# Patient Record
Sex: MALE | Race: BLACK OR AFRICAN AMERICAN | Employment: PART TIME | URBAN - METROPOLITAN AREA
[De-identification: names, ages, dates, MRNs, and addresses within clinical notes are randomized per-mention and may not be internally consistent; named-entity substitution may affect disease eponyms.]

---

## 2018-03-13 ENCOUNTER — HOSPITAL ENCOUNTER (OUTPATIENT)
Dept: PHYSICAL THERAPY | Age: 43
Setting detail: THERAPIES SERIES
Discharge: HOME | End: 2018-03-13
Payer: COMMERCIAL

## 2018-03-19 ENCOUNTER — HOSPITAL ENCOUNTER (OUTPATIENT)
Dept: PHYSICAL THERAPY | Age: 43
Setting detail: THERAPIES SERIES
Discharge: HOME | End: 2018-03-19
Payer: COMMERCIAL

## 2018-03-19 PROCEDURE — 97110 THERAPEUTIC EXERCISES: CPT

## 2018-03-19 PROCEDURE — G0283 ELEC STIM OTHER THAN WOUND: HCPCS

## 2018-03-21 ENCOUNTER — HOSPITAL ENCOUNTER (OUTPATIENT)
Dept: PHYSICAL THERAPY | Age: 43
Setting detail: THERAPIES SERIES
Discharge: HOME | End: 2018-03-21
Payer: COMMERCIAL

## 2018-03-21 PROCEDURE — G8982 BODY POS GOAL STATUS: HCPCS

## 2018-03-21 PROCEDURE — G0283 ELEC STIM OTHER THAN WOUND: HCPCS

## 2018-03-21 PROCEDURE — 97110 THERAPEUTIC EXERCISES: CPT

## 2018-03-21 PROCEDURE — G8981 BODY POS CURRENT STATUS: HCPCS

## 2018-03-21 NOTE — PROGRESS NOTES
[facility]  Phone: [phone] Fax: [phone]       Physical Therapy Daily Treatment Note  Date:  3/21/2018    Patient Name:  Tonya Arguello    :  1975  MRN: [id number]    Restrictions/Precautions:    Diagnosis:  Chronic Mid Thoracic Pain   Treatment Diagnosis:    Insurance/Certification information:  [facility]   Referring Physician:  Haroldo Alcantar CNP   Plan of care signed (Y/N):    Visit# / total visits:  11/  Pain level: /10  Time In: 0920      Time Out:     1000     Subjective:      Exercises:  Exercise/Equipment Resistance/Repetitions Other comments    AirDyne for UE  8 min      Prayer Stretch   L/R 10 reps      Doorway Stretch   10 reps       Wall Jovanny  5 reps                                                                                                                   Other Therapeutic Activities:      Home Exercise Program:      Manual Treatments:      Modalities:  Interferential  estim Lumbar  paraspinal region bilateral 20 min with heat     Timed Code Treatment Minutes:  30     Total Treatment Minutes:  50     Treatment/Activity Tolerance:  [x] Patient tolerated treatment well [] Patient limited by fatigue  [] Patient limited by pain  [] Patient limited by other medical complications  [] Other:     Prognosis: [] Good [x] Fair  [] Poor    Patient Requires Follow-up: [x] Yes  [] No    Plan:   [x] Continue per plan of care [] Alter current plan (see comments)  [] Plan of care initiated [] Hold pending MD visit [] Discharge  Plan for Next Session:         Electronically signed by:  Jesus Godfrey, [address] [place] [address]

## 2018-03-28 ENCOUNTER — HOSPITAL ENCOUNTER (OUTPATIENT)
Dept: PHYSICAL THERAPY | Age: 43
Setting detail: THERAPIES SERIES
Discharge: HOME | End: 2018-03-28
Payer: COMMERCIAL

## 2018-03-28 PROCEDURE — G0283 ELEC STIM OTHER THAN WOUND: HCPCS

## 2018-03-28 PROCEDURE — 97110 THERAPEUTIC EXERCISES: CPT

## 2018-05-29 ENCOUNTER — HOSPITAL ENCOUNTER (OUTPATIENT)
Dept: PHYSICAL THERAPY | Age: 43
Setting detail: THERAPIES SERIES
Discharge: HOME | End: 2018-05-29
Payer: COMMERCIAL

## 2018-05-29 PROCEDURE — G0283 ELEC STIM OTHER THAN WOUND: HCPCS

## 2018-05-29 PROCEDURE — 97161 PT EVAL LOW COMPLEX 20 MIN: CPT

## 2018-05-29 PROCEDURE — G8981 BODY POS CURRENT STATUS: HCPCS

## 2018-05-29 PROCEDURE — G8982 BODY POS GOAL STATUS: HCPCS

## 2018-06-15 ENCOUNTER — HOSPITAL ENCOUNTER (OUTPATIENT)
Dept: PHYSICAL THERAPY | Age: 43
Setting detail: THERAPIES SERIES
Discharge: HOME | End: 2018-06-15
Payer: COMMERCIAL

## 2018-06-15 PROCEDURE — 97110 THERAPEUTIC EXERCISES: CPT

## 2018-06-15 PROCEDURE — G0283 ELEC STIM OTHER THAN WOUND: HCPCS

## 2018-06-18 ENCOUNTER — HOSPITAL ENCOUNTER (OUTPATIENT)
Dept: PHYSICAL THERAPY | Age: 43
Setting detail: THERAPIES SERIES
Discharge: HOME | End: 2018-06-18
Payer: COMMERCIAL

## 2018-06-18 PROCEDURE — 97110 THERAPEUTIC EXERCISES: CPT

## 2018-06-18 PROCEDURE — G0283 ELEC STIM OTHER THAN WOUND: HCPCS

## 2018-07-09 ENCOUNTER — HOSPITAL ENCOUNTER (OUTPATIENT)
Dept: PHYSICAL THERAPY | Age: 43
Setting detail: THERAPIES SERIES
Discharge: HOME | End: 2018-07-09
Payer: COMMERCIAL

## 2018-07-09 PROCEDURE — 97110 THERAPEUTIC EXERCISES: CPT

## 2018-07-09 PROCEDURE — G0283 ELEC STIM OTHER THAN WOUND: HCPCS

## 2018-07-09 NOTE — PROGRESS NOTES
[facility]  Phone: [phone] Fax: [phone]       Physical Therapy Daily Treatment Note  Date:  2018    Patient Name:  Aide Yang    :  1975  MRN: [id number]    Restrictions/Precautions:    Diagnosis:  Back Pain   Treatment Diagnosis:    Insurance/Certification information: [employer]    Referring Physician:  Brayan Smith CNP  Plan of care signed (Y/N):    Visit# / total visits:  4/  Pain level: /10  Time In: 900       Time Out:  940        Subjective:      Exercises:  Exercise/Equipment Resistance/Repetitions Other comments     Bike 10 min       Prayer stretch  10 reps       Doorway Stretch  10 reps     Wall zak   10 reps                                                                                                                   Other Therapeutic Activities:      Home Exercise Program:      Manual Treatments:      Modalities:  Pre Mod Estim Bilateral Thoaco-lumbar paraspinal muscles 15 min with heat     Timed Code Treatment Minutes:  30    Total Treatment Minutes: 40     Treatment/Activity Tolerance:  [x] Patient tolerated treatment well [] Patient limited by fatigue  [] Patient limited by pain  [] Patient limited by other medical complications  [] Other:     Prognosis: [] Good [x] Fair  [] Poor    Patient Requires Follow-up: [x] Yes  [] No    Plan:   [x] Continue per plan of care [] Alter current plan (see comments)  [] Plan of care initiated [] Hold pending MD visit [] Discharge  Plan for Next Session:         Electronically signed by:  Cristopher Moore, [address] [place] [address]

## 2018-07-12 ENCOUNTER — HOSPITAL ENCOUNTER (OUTPATIENT)
Dept: PHYSICAL THERAPY | Age: 43
Setting detail: THERAPIES SERIES
Discharge: HOME | End: 2018-07-12
Payer: COMMERCIAL

## 2018-07-12 PROCEDURE — 97110 THERAPEUTIC EXERCISES: CPT

## 2018-07-12 PROCEDURE — G0283 ELEC STIM OTHER THAN WOUND: HCPCS

## 2018-07-17 ENCOUNTER — HOSPITAL ENCOUNTER (OUTPATIENT)
Dept: PHYSICAL THERAPY | Age: 43
Setting detail: THERAPIES SERIES
Discharge: HOME | End: 2018-07-17
Payer: COMMERCIAL

## 2018-07-17 PROCEDURE — 97110 THERAPEUTIC EXERCISES: CPT

## 2018-07-19 ENCOUNTER — HOSPITAL ENCOUNTER (OUTPATIENT)
Dept: PHYSICAL THERAPY | Age: 43
Setting detail: THERAPIES SERIES
Discharge: HOME | End: 2018-07-19
Payer: COMMERCIAL

## 2018-07-19 NOTE — PROGRESS NOTES
[facility]  Phone: [phone] Fax: [phone]     Physical Therapy  Cancellation/No-show Note  Patient Name:  Connor Ward  :  1975   Date:  2018    For today's appointment patient:  [x]  Cancelled  []  Rescheduled appointment  []  No-show     Reason given by patient:  []  Patient ill  [x]  Conflicting appointment  []  No transportation    []  Conflict with work  []  No reason given  [x]  Other:     Comments:   Next PT appointment 2018     Electronically signed by:  Merly Sharma, [address] [facility]

## 2018-07-31 ENCOUNTER — HOSPITAL ENCOUNTER (OUTPATIENT)
Dept: PHYSICAL THERAPY | Age: 43
Setting detail: THERAPIES SERIES
Discharge: HOME | End: 2018-07-31
Payer: COMMERCIAL

## 2018-07-31 PROCEDURE — 97110 THERAPEUTIC EXERCISES: CPT

## 2018-07-31 PROCEDURE — G0283 ELEC STIM OTHER THAN WOUND: HCPCS

## 2018-07-31 NOTE — PROGRESS NOTES
[facility]  Phone: [phone] Fax: [phone]       Physical Therapy Daily Treatment Note  Date:  2018    Patient Name:  China Valero    :  1975  MRN: [id number]    Restrictions/Precautions:    Diagnosis:  Back Pain   Treatment Diagnosis:    Insurance/Certification information: [employer]    Referring Physician:  Jeannine Arias CNP  Plan of care signed (Y/N):    Visit# / total visits:  7/  Pain level: /10  Time In: 09       Time Out:  940        Subjective:      Exercises:  Exercise/Equipment Resistance/Repetitions Other comments     Bike 10 min       Prayer stretch  10 reps       Doorway Stretch  10 reps     Wall zak   10 reps                                                                                                                   Other Therapeutic Activities:      Home Exercise Program:      Manual Treatments:      Modalities:  Pre Mod Estim Bilateral Thoaco-lumbar paraspinal muscles 15 min with heat     Timed Code Treatment Minutes:  30    Total Treatment Minutes: 40     Treatment/Activity Tolerance:  [x] Patient tolerated treatment well [] Patient limited by fatigue  [] Patient limited by pain  [] Patient limited by other medical complications  [] Other:     Prognosis: [] Good [x] Fair  [] Poor    Patient Requires Follow-up: [x] Yes  [] No    Plan:   [x] Continue per plan of care [] Alter current plan (see comments)  [] Plan of care initiated [] Hold pending MD visit [] Discharge  Plan for Next Session:         Electronically signed by:  Mendel Lenis, [address] [place] [address]

## 2018-08-02 ENCOUNTER — HOSPITAL ENCOUNTER (OUTPATIENT)
Dept: PHYSICAL THERAPY | Age: 43
Setting detail: THERAPIES SERIES
Discharge: HOME | End: 2018-08-02
Payer: COMMERCIAL

## 2018-08-02 PROCEDURE — G0283 ELEC STIM OTHER THAN WOUND: HCPCS

## 2018-08-02 PROCEDURE — 97110 THERAPEUTIC EXERCISES: CPT

## 2018-08-02 NOTE — PROGRESS NOTES
[facility]  Phone: [phone] Fax: [phone]       Physical Therapy Daily Treatment Note  Date:  2018    Patient Name:  Katelyn Adrian    :  1975  MRN: [id number]    Restrictions/Precautions:    Diagnosis:  Back Pain   Treatment Diagnosis:    Insurance/Certification information: [employer]    Referring Physician:  Magen Lentz CNP  Plan of care signed (Y/N):    Visit# / total visits:  8  Pain level: /10  Time In: 900       Time Out:  940        Subjective:      Exercises:  Exercise/Equipment Resistance/Repetitions Other comments     Bike 10 min       Prayer stretch  10 reps       Doorway Stretch  10 reps     Wall zak   10 reps                                                                                                                   Other Therapeutic Activities:      Home Exercise Program:      Manual Treatments:      Modalities:  Pre Mod Estim Bilateral Thoaco-lumbar paraspinal muscles 15 min with heat     Timed Code Treatment Minutes:  30    Total Treatment Minutes: 40     Treatment/Activity Tolerance:  [x] Patient tolerated treatment well [] Patient limited by fatigue  [] Patient limited by pain  [] Patient limited by other medical complications  [] Other:     Prognosis: [] Good [x] Fair  [] Poor    Patient Requires Follow-up: [x] Yes  [] No    Plan:   [x] Continue per plan of care [] Alter current plan (see comments)  [] Plan of care initiated [] Hold pending MD visit [] Discharge  Plan for Next Session:         Electronically signed by:  Juan Olivarez, [address] [place] [address]

## 2019-01-25 ENCOUNTER — HOSPITAL ENCOUNTER (OUTPATIENT)
Dept: ULTRASOUND IMAGING | Age: 44
Discharge: HOME | End: 2019-01-27
Payer: COMMERCIAL

## 2019-01-25 DIAGNOSIS — R52 PAIN: ICD-10-CM

## 2019-01-25 PROCEDURE — 76870 US EXAM SCROTUM: CPT

## 2019-02-22 ENCOUNTER — OFFICE VISIT (OUTPATIENT)
Dept: PAIN MANAGEMENT | Age: 44
End: 2019-02-22
Payer: COMMERCIAL

## 2019-02-22 ENCOUNTER — HOSPITAL ENCOUNTER (OUTPATIENT)
Age: 44
Discharge: HOME | End: 2019-02-24
Payer: COMMERCIAL

## 2019-02-22 VITALS
TEMPERATURE: 98.7 F | HEIGHT: 70 IN | WEIGHT: 160 LBS | SYSTOLIC BLOOD PRESSURE: 110 MMHG | HEART RATE: 58 BPM | DIASTOLIC BLOOD PRESSURE: 70 MMHG | RESPIRATION RATE: 18 BRPM | BODY MASS INDEX: 22.9 KG/M2

## 2019-02-22 DIAGNOSIS — G89.4 CHRONIC PAIN SYNDROME: ICD-10-CM

## 2019-02-22 DIAGNOSIS — M47.816 LUMBAR FACET ARTHROPATHY: Primary | ICD-10-CM

## 2019-02-22 DIAGNOSIS — R10.813 RIGHT LOWER QUADRANT ABDOMINAL TENDERNESS WITHOUT REBOUND TENDERNESS: ICD-10-CM

## 2019-02-22 DIAGNOSIS — M47.816 LUMBAR SPONDYLOSIS: ICD-10-CM

## 2019-02-22 DIAGNOSIS — M51.9 LUMBAR DISC DISORDER: ICD-10-CM

## 2019-02-22 LAB — SPECIFIC GRAVITY UA: 1.01 (ref 1–1.03)

## 2019-02-22 PROCEDURE — 4004F PT TOBACCO SCREEN RCVD TLK: CPT | Performed by: PAIN MEDICINE

## 2019-02-22 PROCEDURE — 80307 DRUG TEST PRSMV CHEM ANLYZR: CPT

## 2019-02-22 PROCEDURE — G0480 DRUG TEST DEF 1-7 CLASSES: HCPCS

## 2019-02-22 PROCEDURE — 99204 OFFICE O/P NEW MOD 45 MIN: CPT | Performed by: PAIN MEDICINE

## 2019-02-22 PROCEDURE — G8484 FLU IMMUNIZE NO ADMIN: HCPCS | Performed by: PAIN MEDICINE

## 2019-02-22 PROCEDURE — G8420 CALC BMI NORM PARAMETERS: HCPCS | Performed by: PAIN MEDICINE

## 2019-02-22 PROCEDURE — G8427 DOCREV CUR MEDS BY ELIG CLIN: HCPCS | Performed by: PAIN MEDICINE

## 2019-02-22 RX ORDER — OXYCODONE HYDROCHLORIDE AND ACETAMINOPHEN 325; 5 MG/5ML; MG/5ML
SOLUTION ORAL DAILY PRN
COMMUNITY
End: 2020-07-16

## 2019-02-22 RX ORDER — CYCLOBENZAPRINE HCL 5 MG
5 TABLET ORAL 3 TIMES DAILY PRN
COMMUNITY
End: 2020-07-16 | Stop reason: SDUPTHER

## 2019-02-22 RX ORDER — MULTIVIT-MIN/IRON/FOLIC ACID/K 18-600-40
CAPSULE ORAL DAILY
COMMUNITY

## 2019-02-22 RX ORDER — IBUPROFEN 800 MG/1
800 TABLET ORAL EVERY 8 HOURS PRN
COMMUNITY
End: 2019-08-12

## 2019-02-27 LAB
6AM URINE: <10 NG/ML
7-AMINOCLONAZEPAM, URINE: <5 NG/ML
ALPHA-HYDROXYALPRAZOLAM, URINE: <5 NG/ML
ALPHA-HYDROXYMIDAZOLAM, URINE: <20 NG/ML
ALPRAZOLAM, URINE: <5 NG/ML
CHLORDIAZEPOXIDE, URINE: <20 NG/ML
CLONAZEPAM, URINE: <5 NG/ML
CODEINE, URINE: <20 NG/ML
DIAZEPAM, URINE: <20 NG/ML
HYDROCODONE, URINE: <20 NG/ML
HYDROMORPHONE, URINE: <20 NG/ML
LORAZEPAM, URINE: <20 NG/ML
MIDAZOLAM, URINE: <20 NG/ML
MORPHINE URINE: <20 NG/ML
NORDIAZEPAM, URINE: <20 NG/ML
NORHYDROCODONE, URINE: <20 NG/ML
NOROXYCODONE, URINE: 1664 NG/ML
NOROXYMORPHONE, URINE: 120 NG/ML
OXAZEPAM, URINE: <20 NG/ML
OXYCODONE, URINE CONFIRMATION: 315 NG/ML
OXYMORPHONE, URINE: <20 NG/ML
TEMAZEPAM, URINE: <20 NG/ML

## 2019-03-02 LAB
Lab: NORMAL
REPORT: NORMAL
THIS TEST SENT TO: NORMAL

## 2019-08-12 ENCOUNTER — APPOINTMENT (OUTPATIENT)
Dept: GENERAL RADIOLOGY | Age: 44
End: 2019-08-12
Payer: COMMERCIAL

## 2019-08-12 ENCOUNTER — HOSPITAL ENCOUNTER (EMERGENCY)
Age: 44
Discharge: HOME | End: 2019-08-12
Payer: COMMERCIAL

## 2019-08-12 VITALS
HEART RATE: 80 BPM | OXYGEN SATURATION: 99 % | HEIGHT: 70 IN | SYSTOLIC BLOOD PRESSURE: 138 MMHG | WEIGHT: 160 LBS | TEMPERATURE: 96.8 F | DIASTOLIC BLOOD PRESSURE: 80 MMHG | BODY MASS INDEX: 22.9 KG/M2 | RESPIRATION RATE: 16 BRPM

## 2019-08-12 DIAGNOSIS — S32.010S CLOSED COMPRESSION FRACTURE OF FIRST LUMBAR VERTEBRA, SEQUELA: ICD-10-CM
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
## 2019-08-12 DIAGNOSIS — S62.308A CLOSED NONDISPLACED FRACTURE OF FIFTH METACARPAL BONE, UNSPECIFIED FRACTURE MORPHOLOGY, INITIAL ENCOUNTER: ICD-10-CM

## 2019-08-12 PROCEDURE — 72100 X-RAY EXAM L-S SPINE 2/3 VWS: CPT

## 2019-08-12 PROCEDURE — 96372 THER/PROPH/DIAG INJ SC/IM: CPT

## 2019-08-12 PROCEDURE — 73110 X-RAY EXAM OF WRIST: CPT

## 2019-08-12 PROCEDURE — 6360000002 HC RX W HCPCS: Performed by: PHYSICIAN ASSISTANT

## 2019-08-12 PROCEDURE — 99283 EMERGENCY DEPT VISIT LOW MDM: CPT

## 2019-08-12 PROCEDURE — 73130 X-RAY EXAM OF HAND: CPT

## 2019-08-12 RX ORDER — NAPROXEN 500 MG/1
500 TABLET ORAL 2 TIMES DAILY PRN
Qty: 14 TABLET | Refills: 0 | Status: SHIPPED | OUTPATIENT
Start: 2019-08-12 | End: 2020-07-16

## 2019-08-12 RX ORDER — ORPHENADRINE CITRATE 30 MG/ML
60 INJECTION INTRAMUSCULAR; INTRAVENOUS ONCE
Status: COMPLETED | OUTPATIENT
Start: 2019-08-12 | End: 2019-08-12

## 2019-08-12 RX ORDER — KETOROLAC TROMETHAMINE 30 MG/ML
30 INJECTION, SOLUTION INTRAMUSCULAR; INTRAVENOUS ONCE
Status: COMPLETED | OUTPATIENT
Start: 2019-08-12 | End: 2019-08-12

## 2019-08-12 RX ORDER — DEXAMETHASONE SODIUM PHOSPHATE 10 MG/ML
10 INJECTION INTRAMUSCULAR; INTRAVENOUS ONCE
Status: COMPLETED | OUTPATIENT
Start: 2019-08-12 | End: 2019-08-12

## 2019-08-12 RX ADMIN — DEXAMETHASONE SODIUM PHOSPHATE 10 MG: 10 INJECTION INTRAMUSCULAR; INTRAVENOUS at 19:22

## 2019-08-12 RX ADMIN — ORPHENADRINE CITRATE 60 MG: 30 INJECTION INTRAMUSCULAR; INTRAVENOUS at 19:22

## 2019-08-12 RX ADMIN — KETOROLAC TROMETHAMINE 30 MG: 30 INJECTION, SOLUTION INTRAMUSCULAR at 19:22

## 2019-08-12 ASSESSMENT — PAIN DESCRIPTION - PAIN TYPE: TYPE: ACUTE PAIN;CHRONIC PAIN

## 2019-08-12 ASSESSMENT — PAIN DESCRIPTION - LOCATION: LOCATION: BACK;HAND

## 2019-08-12 ASSESSMENT — PAIN SCALES - GENERAL: PAINLEVEL_OUTOF10: 7

## 2019-08-12 ASSESSMENT — PAIN DESCRIPTION - ORIENTATION: ORIENTATION: LEFT

## 2019-08-12 NOTE — ED PROVIDER NOTES
Drug: Marijuana. He reports that he does not drink alcohol. Family History: family history includes No Known Problems in his brother, father, mother, and sister. Allergies: Patient has no known allergies. Physical Exam   Oxygen Saturation Interpretation: Normal.  ED Triage Vitals   BP Temp Temp Source Pulse Resp SpO2 Height Weight   08/12/19 1732 08/12/19 1732 08/12/19 1732 08/12/19 1700 08/12/19 1732 08/12/19 1700 08/12/19 1732 08/12/19 1732   (!) 147/80 98 °F (36.7 °C) Temporal 85 18 97 % 5' 10\" (1.778 m) 160 lb (72.6 kg)      Physical Exam  · Constitutional/General: Alert and oriented x3, well appearing, non toxic in NAD  · HEENT:  NC/NT. Airway patent. · Neck: Supple, full ROM, non tender to palpation in the midline, no stridor, no crepitus, no meningeal signs  · Respiratory: Lungs clear to auscultation bilaterally, no wheezes, rales, or rhonchi. Not in respiratory distress  · CV:  Regular rate. Regular rhythm. No murmurs, gallops, or rubs. 2+ distal pulses  · Chest: No chest wall tenderness  · GI:  Abdomen Soft, Non tender, Non distended. +BS. No rebound, guarding, or rigidity. No pulsatile masses. · Back:  Diffuse low back pain to include the midline. The is no specific vertebral or intervertebral tenderness. No crepitus or step off. · Musculoskeletal: Diffuse tenderness to the left hand and wrist, to include the anatomical snuffbox. Moves all extremities x 4. Warm and well perfused, no clubbing, cyanosis, or edema. Capillary refill <3 seconds  · Integument: skin warm and dry. No rashes. · Lymphatic: no lymphadenopathy noted  · Neurologic: GCS 15, no focal deficits, symmetric strength 5/5 in the upper and lower extremities bilaterally  · Psychiatric: Normal Affect     Lab / Imaging Results   (All laboratory and radiology results have been personally reviewed by myself)  Labs:  No results found for this visit on 08/12/19. Imaging:   All Radiology results interpreted by Radiologist unless otherwise noted. XR LUMBAR SPINE (2-3 VIEWS)   Final Result      Mild loss of height involving anterior aspect of T12, L1, and L2   vertebral bodies which could suggest anterior compression fractures of   uncertain chronicity. If clinically warranted, MRI could be helpful   for further evaluation. XR HAND LEFT (MIN 3 VIEWS)   Final Result      1. Comminuted, moderately displaced fracture involving fifth   metacarpal bone. 2. Comminuted fracture involving distal phalanx of fourth digit with   displacement of fracture fragments. XR WRIST LEFT (MIN 3 VIEWS)   Final Result      1. Mild widened appearance of distal radioulnar joint. This finding   may be related to positioning versus ligamentous injury. Clinical   correlation recommended. 2. Comminuted fracture of fifth metacarpal bone. ED Course / Medical Decision Making     Medications   ketorolac (TORADOL) injection 30 mg (30 mg Intramuscular Given 8/12/19 1922)   orphenadrine (NORFLEX) injection 60 mg (60 mg Intramuscular Given 8/12/19 1922)   dexamethasone (DECADRON) injection 10 mg (10 mg Intramuscular Given 8/12/19 1922)     ED Course as of Aug 13 2031   Mon Aug 12, 2019   2050 Dharmesh Melton is alert and oriented x3 speech clear. Abd flat, non-tender to palpation + bowel sounds. He is having mild left sided lower paraspinal tenderness. No mid-line tenderness. No motor or sensory deficits. He is ambulatory  gait is steady. He reports that he has \"injured/broke\" his back  a few years ago. Imaging results discussed with patient which revealed  most likely old compression fracture T12, L1. Comminuted fracture of 4th and 5th metatarsal. Peripheral pulses 2+ palpable. Ulner gutter splint applied. He was prescribed Naproxen and discharged in the care of police officers.      [DL]      ED Course User Index  [DL] Sirisha Odom, APRN - CNP   Re-examination:  8/12/19       Time: 1745    Patient care transitioned to Sirisha Reasons, dispo

## 2019-08-13 ENCOUNTER — TELEPHONE (OUTPATIENT)
Dept: ADMINISTRATIVE | Age: 44
End: 2019-08-13

## 2019-08-14 NOTE — TELEPHONE ENCOUNTER
Spoke with Aide Alanis from [facility] re ed f/u--she was able to find referral for pt and appt was made for 8/21 at 1111

## 2019-08-20 DIAGNOSIS — S62.300A CLOSED FRACTURE OF SECOND METACARPAL BONE OF RIGHT HAND, UNSPECIFIED FRACTURE MORPHOLOGY, INITIAL ENCOUNTER: Primary | ICD-10-CM

## 2019-08-22 ENCOUNTER — TELEPHONE (OUTPATIENT)
Dept: ADMINISTRATIVE | Age: 44
End: 2019-08-22

## 2019-08-28 ENCOUNTER — OFFICE VISIT (OUTPATIENT)
Dept: ORTHOPEDIC SURGERY | Age: 44
End: 2019-08-28
Payer: COMMERCIAL

## 2019-08-28 ENCOUNTER — HOSPITAL ENCOUNTER (OUTPATIENT)
Dept: GENERAL RADIOLOGY | Age: 44
Discharge: HOME | End: 2019-08-30
Payer: COMMERCIAL

## 2019-08-28 VITALS
RESPIRATION RATE: 20 BRPM | HEIGHT: 70 IN | HEART RATE: 60 BPM | BODY MASS INDEX: 22.48 KG/M2 | WEIGHT: 157 LBS | DIASTOLIC BLOOD PRESSURE: 89 MMHG | TEMPERATURE: 97.4 F | SYSTOLIC BLOOD PRESSURE: 130 MMHG

## 2019-08-28 DIAGNOSIS — S62.300A CLOSED FRACTURE OF SECOND METACARPAL BONE OF RIGHT HAND, UNSPECIFIED FRACTURE MORPHOLOGY, INITIAL ENCOUNTER: ICD-10-CM

## 2019-08-28 DIAGNOSIS — S62.327A DISPLACED FRACTURE OF SHAFT OF FIFTH METACARPAL BONE, LEFT HAND, INITIAL ENCOUNTER FOR CLOSED FRACTURE: Primary | ICD-10-CM

## 2019-08-28 PROCEDURE — G8427 DOCREV CUR MEDS BY ELIG CLIN: HCPCS | Performed by: PHYSICIAN ASSISTANT

## 2019-08-28 PROCEDURE — 73130 X-RAY EXAM OF HAND: CPT

## 2019-08-28 PROCEDURE — 99204 OFFICE O/P NEW MOD 45 MIN: CPT | Performed by: PHYSICIAN ASSISTANT

## 2019-08-28 PROCEDURE — 4004F PT TOBACCO SCREEN RCVD TLK: CPT | Performed by: PHYSICIAN ASSISTANT

## 2019-08-28 PROCEDURE — 73110 X-RAY EXAM OF WRIST: CPT

## 2019-08-28 PROCEDURE — 99214 OFFICE O/P EST MOD 30 MIN: CPT

## 2019-08-28 PROCEDURE — G8420 CALC BMI NORM PARAMETERS: HCPCS | Performed by: PHYSICIAN ASSISTANT

## 2019-08-28 RX ORDER — IBUPROFEN 800 MG/1
800 TABLET ORAL EVERY 6 HOURS PRN
COMMUNITY
End: 2020-07-16

## 2019-08-28 NOTE — PROGRESS NOTES
Negative for pallor, rash and wound. Neurological: Negative for dizziness, tremors, seizures, weakness, light-headedness, no TIA or stroke symptoms. No numbness and headaches. Psychiatric/Behavioral: Negative. OBJECTIVE:      Physical Examination:   General appearance: alert, well appearing, and in no distress,  normal appearing weight. No visible signs of trauma   Mental status: alert, oriented to person, place, and time, normal mood, behavior, speech, dress, motor activity, and thought processes  Abdomen: soft, nondistended  Resp:   resp easy and unlabored, no audible wheezes note, normal symmetrical expansion of both hemithoraces  Cardiac: distal pulses palpable, skin and extremities well perfused  Neurological: alert, oriented X3, normal speech, no focal findings or movement disorder noted, motor and sensory grossly normal bilaterally, normal muscle tone, no tremors, strength 5/5, normal gait and station  HEENT: normochephalic atraumatic, external ears and eyes normal, sclera normal, neck supple, no nasal discharge.    Extremities:   peripheral pulses normal, no edema, redness or tenderness in the calves   Skin: normal coloration, no rashes or open wounds, no suspicious skin lesions noted  Psych: Affect euthymic   Musculoskeletal:   Extremity:  Left Upper Extremity  Skin is clean dry and intact, not broken down  Mild edema noted about the 5th finger  No obvious deformity or malrotation  Radial pulse palpable, fingers warm with BCR  FDS and FDP intact, full extension of the 5th finger, able wiggle other finger, no wrist pain  Subjectively states sensation intact to radial/medial/ulnar distribution  Compartments soft and compressible    /89 (Site: Right Upper Arm, Position: Sitting, Cuff Size: Medium Adult)   Pulse 60   Temp 97.4 °F (36.3 °C) (Oral)   Resp 20   Ht 5' 10\" (1.778 m)   Wt 157 lb (71.2 kg)   BMI 22.53 kg/m²      XR: 8/28/19   X-rays obtained today of the left hand demonstrating 5th metacarpal shaft fracture no significant change in fracture alignment compared to previous x-rays, mild displacement noted. No interval healing appreciated. No other acute osseous abnormalities. Old distal phalanx tuft fracture noted of the fourth finger. X-rays of the left wrist were also obtained today demonstrating no acute fracture, pathology, or bony deformity. Previous report was read as possible widening of the DRUJ joint and do not appreciate this on exam, patient has no clinical correlation with this. ASSESSMENT:     Diagnosis Orders   1. Displaced fracture of shaft of fifth metacarpal bone, left hand, initial encounter for closed fracture          Discussion:  Had a discussion with patient regarding his diagnosis of 1/5 metacarpal fracture, in acceptable alignment. Discussed nonoperative versus operative intervention. He is agreeable to proceeding with nonoperative intervention with close observation. He was placed in a ulnar gutter splint today, he will remain nonweightbearing. Return to the office in 2 to 3 weeks for recheck of x-rays and possible transition to a removable splint and start occupational therapy. He is in agreement with this treatment plan. Will call if any issues or concerns. Discussed he needs to follow-up with Cory regarding his L1 compression fracture.     PLAN:  Ulnar gutter splint  Remain nonweightbearing  Return to the office in 2 to 3 weeks  Call if any issues or concerns    Electronically signed by Tomer Ascencio PA-C on 8/28/2019 at 8:54 AM

## 2019-09-05 ENCOUNTER — TELEPHONE (OUTPATIENT)
Dept: ORTHOPEDIC SURGERY | Age: 44
End: 2019-09-05

## 2019-09-06 ENCOUNTER — NURSE ONLY (OUTPATIENT)
Dept: ORTHOPEDIC SURGERY | Age: 44
End: 2019-09-06
Payer: COMMERCIAL

## 2019-09-06 DIAGNOSIS — S62.317D DISPLACED FRACTURE OF BASE OF FIFTH METACARPAL BONE, LEFT HAND, SUBSEQUENT ENCOUNTER FOR FRACTURE WITH ROUTINE HEALING: ICD-10-CM

## 2019-09-06 DIAGNOSIS — S62.327A DISPLACED FRACTURE OF SHAFT OF FIFTH METACARPAL BONE, LEFT HAND, INITIAL ENCOUNTER FOR CLOSED FRACTURE: Primary | ICD-10-CM

## 2019-09-06 PROCEDURE — 99213 OFFICE O/P EST LOW 20 MIN: CPT

## 2019-09-06 NOTE — PROGRESS NOTES
Leretha Jeans came in for splint change. He stated he slept on it and it crack and his wrist can bend. Splint was removed without difficulty. .An ulnar gutter splint was applied to His Left arm. Neurovascular status was checked pre and post application. Patient was neurovascularly intact after the application process. The patient denied any issues with fit or comfort of the cast/splint. Patient insrtucted to keep cast/splint clean and dry, don't get wet, and NO activities that put them at risk for falling. Patient instructed to call our office if there are any issues with the cast/splint.

## 2019-09-30 ENCOUNTER — HOSPITAL ENCOUNTER (OUTPATIENT)
Dept: GENERAL RADIOLOGY | Age: 44
Discharge: HOME | End: 2019-10-02
Payer: COMMERCIAL

## 2019-09-30 ENCOUNTER — OFFICE VISIT (OUTPATIENT)
Dept: ORTHOPEDIC SURGERY | Age: 44
End: 2019-09-30
Payer: COMMERCIAL

## 2019-09-30 VITALS — OXYGEN SATURATION: 97 % | HEART RATE: 78 BPM | BODY MASS INDEX: 22.48 KG/M2 | HEIGHT: 70 IN | WEIGHT: 157 LBS

## 2019-09-30 DIAGNOSIS — S62.327A DISPLACED FRACTURE OF SHAFT OF FIFTH METACARPAL BONE, LEFT HAND, INITIAL ENCOUNTER FOR CLOSED FRACTURE: Primary | ICD-10-CM

## 2019-09-30 DIAGNOSIS — S62.327A DISPLACED FRACTURE OF SHAFT OF FIFTH METACARPAL BONE, LEFT HAND, INITIAL ENCOUNTER FOR CLOSED FRACTURE: ICD-10-CM

## 2019-09-30 PROCEDURE — 73130 X-RAY EXAM OF HAND: CPT

## 2019-09-30 PROCEDURE — G8420 CALC BMI NORM PARAMETERS: HCPCS | Performed by: NURSE PRACTITIONER

## 2019-09-30 PROCEDURE — 99213 OFFICE O/P EST LOW 20 MIN: CPT | Performed by: NURSE PRACTITIONER

## 2019-09-30 PROCEDURE — 4004F PT TOBACCO SCREEN RCVD TLK: CPT | Performed by: NURSE PRACTITIONER

## 2019-09-30 PROCEDURE — G8427 DOCREV CUR MEDS BY ELIG CLIN: HCPCS | Performed by: NURSE PRACTITIONER

## 2019-10-22 ENCOUNTER — HOSPITAL ENCOUNTER (OUTPATIENT)
Dept: OCCUPATIONAL THERAPY | Age: 44
Setting detail: THERAPIES SERIES
End: 2019-10-22
Payer: COMMERCIAL

## 2019-10-22 ENCOUNTER — HOSPITAL ENCOUNTER (OUTPATIENT)
Dept: OCCUPATIONAL THERAPY | Age: 44
Setting detail: THERAPIES SERIES
Discharge: HOME | End: 2019-10-22
Payer: COMMERCIAL

## 2019-10-22 PROCEDURE — 97110 THERAPEUTIC EXERCISES: CPT | Performed by: OCCUPATIONAL THERAPIST

## 2019-10-22 PROCEDURE — 97140 MANUAL THERAPY 1/> REGIONS: CPT | Performed by: OCCUPATIONAL THERAPIST

## 2019-10-22 PROCEDURE — 97165 OT EVAL LOW COMPLEX 30 MIN: CPT | Performed by: OCCUPATIONAL THERAPIST

## 2019-10-24 ENCOUNTER — APPOINTMENT (OUTPATIENT)
Dept: OCCUPATIONAL THERAPY | Age: 44
End: 2019-10-24
Payer: COMMERCIAL

## 2019-10-29 ENCOUNTER — HOSPITAL ENCOUNTER (OUTPATIENT)
Dept: OCCUPATIONAL THERAPY | Age: 44
Setting detail: THERAPIES SERIES
Discharge: HOME | End: 2019-10-29
Payer: COMMERCIAL

## 2019-10-31 ENCOUNTER — HOSPITAL ENCOUNTER (OUTPATIENT)
Dept: OCCUPATIONAL THERAPY | Age: 44
Setting detail: THERAPIES SERIES
Discharge: HOME | End: 2019-10-31
Payer: COMMERCIAL

## 2019-10-31 PROCEDURE — 97110 THERAPEUTIC EXERCISES: CPT

## 2019-10-31 PROCEDURE — 97140 MANUAL THERAPY 1/> REGIONS: CPT

## 2019-10-31 PROCEDURE — 97022 WHIRLPOOL THERAPY: CPT

## 2019-11-05 ENCOUNTER — HOSPITAL ENCOUNTER (OUTPATIENT)
Dept: OCCUPATIONAL THERAPY | Age: 44
Setting detail: THERAPIES SERIES
Discharge: HOME | End: 2019-11-05
Payer: COMMERCIAL

## 2019-11-05 PROCEDURE — 97110 THERAPEUTIC EXERCISES: CPT

## 2019-11-05 PROCEDURE — 97140 MANUAL THERAPY 1/> REGIONS: CPT

## 2019-11-05 PROCEDURE — 97022 WHIRLPOOL THERAPY: CPT

## 2019-11-07 ENCOUNTER — HOSPITAL ENCOUNTER (OUTPATIENT)
Dept: OCCUPATIONAL THERAPY | Age: 44
Setting detail: THERAPIES SERIES
Discharge: HOME | End: 2019-11-07
Payer: COMMERCIAL

## 2019-11-12 ENCOUNTER — HOSPITAL ENCOUNTER (OUTPATIENT)
Dept: OCCUPATIONAL THERAPY | Age: 44
Setting detail: THERAPIES SERIES
Discharge: HOME | End: 2019-11-12
Payer: COMMERCIAL

## 2019-11-12 PROCEDURE — 97022 WHIRLPOOL THERAPY: CPT

## 2019-11-12 PROCEDURE — 97110 THERAPEUTIC EXERCISES: CPT

## 2019-11-12 PROCEDURE — 97140 MANUAL THERAPY 1/> REGIONS: CPT

## 2019-11-14 ENCOUNTER — HOSPITAL ENCOUNTER (OUTPATIENT)
Dept: OCCUPATIONAL THERAPY | Age: 44
Setting detail: THERAPIES SERIES
Discharge: HOME | End: 2019-11-14
Payer: COMMERCIAL

## 2019-11-18 DIAGNOSIS — M79.642 LEFT HAND PAIN: Primary | ICD-10-CM

## 2019-11-19 ENCOUNTER — HOSPITAL ENCOUNTER (OUTPATIENT)
Dept: OCCUPATIONAL THERAPY | Age: 44
Setting detail: THERAPIES SERIES
Discharge: HOME | End: 2019-11-19
Payer: COMMERCIAL

## 2019-11-19 PROCEDURE — 97140 MANUAL THERAPY 1/> REGIONS: CPT | Performed by: OCCUPATIONAL THERAPIST

## 2019-11-19 PROCEDURE — 97022 WHIRLPOOL THERAPY: CPT | Performed by: OCCUPATIONAL THERAPIST

## 2019-11-19 PROCEDURE — 97110 THERAPEUTIC EXERCISES: CPT | Performed by: OCCUPATIONAL THERAPIST

## 2019-11-21 ENCOUNTER — APPOINTMENT (OUTPATIENT)
Dept: OCCUPATIONAL THERAPY | Age: 44
End: 2019-11-21
Payer: COMMERCIAL

## 2019-11-26 ENCOUNTER — HOSPITAL ENCOUNTER (OUTPATIENT)
Dept: OCCUPATIONAL THERAPY | Age: 44
Setting detail: THERAPIES SERIES
Discharge: HOME | End: 2019-11-26
Payer: COMMERCIAL

## 2019-11-26 PROCEDURE — 97022 WHIRLPOOL THERAPY: CPT

## 2019-11-26 PROCEDURE — 97760 ORTHOTIC MGMT&TRAING 1ST ENC: CPT

## 2019-11-26 PROCEDURE — 97110 THERAPEUTIC EXERCISES: CPT

## 2019-11-26 PROCEDURE — 97140 MANUAL THERAPY 1/> REGIONS: CPT

## 2019-12-03 ENCOUNTER — HOSPITAL ENCOUNTER (OUTPATIENT)
Dept: OCCUPATIONAL THERAPY | Age: 44
Setting detail: THERAPIES SERIES
Discharge: HOME | End: 2019-12-03
Payer: COMMERCIAL

## 2019-12-03 PROCEDURE — 97110 THERAPEUTIC EXERCISES: CPT

## 2019-12-03 PROCEDURE — 97022 WHIRLPOOL THERAPY: CPT

## 2019-12-03 PROCEDURE — 97140 MANUAL THERAPY 1/> REGIONS: CPT

## 2019-12-10 ENCOUNTER — HOSPITAL ENCOUNTER (OUTPATIENT)
Dept: OCCUPATIONAL THERAPY | Age: 44
Setting detail: THERAPIES SERIES
Discharge: HOME | End: 2019-12-10
Payer: COMMERCIAL

## 2020-07-16 ENCOUNTER — OFFICE VISIT (OUTPATIENT)
Dept: SURGERY | Age: 45
End: 2020-07-16
Payer: COMMERCIAL

## 2020-07-16 VITALS
HEART RATE: 93 BPM | OXYGEN SATURATION: 96 % | RESPIRATION RATE: 14 BRPM | SYSTOLIC BLOOD PRESSURE: 119 MMHG | HEIGHT: 70 IN | WEIGHT: 150 LBS | BODY MASS INDEX: 21.47 KG/M2 | DIASTOLIC BLOOD PRESSURE: 69 MMHG | TEMPERATURE: 98 F

## 2020-07-16 PROBLEM — S62.327A DISPLACED FRACTURE OF SHAFT OF FIFTH METACARPAL BONE, LEFT HAND, INITIAL ENCOUNTER FOR CLOSED FRACTURE: Status: RESOLVED | Noted: 2019-08-28 | Resolved: 2020-07-16

## 2020-07-16 PROCEDURE — 99204 OFFICE O/P NEW MOD 45 MIN: CPT | Performed by: SURGERY

## 2020-07-16 PROCEDURE — G8420 CALC BMI NORM PARAMETERS: HCPCS | Performed by: SURGERY

## 2020-07-16 PROCEDURE — 99203 OFFICE O/P NEW LOW 30 MIN: CPT | Performed by: SURGERY

## 2020-07-16 PROCEDURE — G8427 DOCREV CUR MEDS BY ELIG CLIN: HCPCS | Performed by: SURGERY

## 2020-07-16 PROCEDURE — 4004F PT TOBACCO SCREEN RCVD TLK: CPT | Performed by: SURGERY

## 2020-07-16 RX ORDER — MELOXICAM 7.5 MG/1
7.5 TABLET ORAL DAILY
Qty: 30 TABLET | Refills: 3 | Status: SHIPPED
Start: 2020-07-16 | End: 2020-07-16 | Stop reason: SDUPTHER

## 2020-07-16 RX ORDER — GABAPENTIN 100 MG/1
100 CAPSULE ORAL 3 TIMES DAILY
Qty: 180 CAPSULE | Refills: 1 | Status: SHIPPED | OUTPATIENT
Start: 2020-07-16 | End: 2021-01-12

## 2020-07-16 RX ORDER — GABAPENTIN 100 MG/1
100 CAPSULE ORAL 3 TIMES DAILY
Qty: 180 CAPSULE | Refills: 1 | Status: SHIPPED
Start: 2020-07-16 | End: 2020-07-16 | Stop reason: SDUPTHER

## 2020-07-16 RX ORDER — CYCLOBENZAPRINE HCL 5 MG
5 TABLET ORAL 3 TIMES DAILY PRN
Qty: 60 TABLET | Refills: 2 | Status: SHIPPED | OUTPATIENT
Start: 2020-07-16

## 2020-07-16 RX ORDER — GABAPENTIN 100 MG/1
100 CAPSULE ORAL 2 TIMES DAILY
Qty: 180 CAPSULE | Refills: 1 | Status: SHIPPED
Start: 2020-07-16 | End: 2020-07-16 | Stop reason: DRUGHIGH

## 2020-07-16 RX ORDER — MELOXICAM 7.5 MG/1
7.5 TABLET ORAL DAILY
Qty: 30 TABLET | Refills: 3 | Status: SHIPPED | OUTPATIENT
Start: 2020-07-16

## 2020-07-16 RX ORDER — CYCLOBENZAPRINE HCL 5 MG
5 TABLET ORAL 3 TIMES DAILY PRN
Qty: 60 TABLET | Refills: 2 | Status: SHIPPED
Start: 2020-07-16 | End: 2020-07-16 | Stop reason: SDUPTHER

## 2020-07-16 NOTE — PROGRESS NOTES
times daily as needed for Pain  Patient not taking: Reported on 9/30/2019 8/12/19 8/19/19  MARYBEL Koenig - JONATHON   oxyCODONE-acetaminophen (ROXICET) 5-325 MG/5ML solution Take by mouth daily as needed for Pain. Ordered every 6 hours but pt takes once daily . Historical Provider, MD       No Known Allergies    Family History   Problem Relation Age of Onset    No Known Problems Mother     No Known Problems Father     No Known Problems Brother     No Known Problems Sister        Social History     Tobacco Use    Smoking status: Current Every Day Smoker     Packs/day: 0.50     Years: 25.00     Pack years: 12.50     Types: Cigarettes    Smokeless tobacco: Never Used   Substance Use Topics    Alcohol use: No    Drug use: Yes     Types: Marijuana     Comment: once a week with last smoked 4 days ago         Review of Systems   General ROS: negative  Hematological and Lymphatic ROS: negative  Respiratory ROS: no cough, shortness of breath, or wheezing  Cardiovascular ROS: no chest pain or dyspnea on exertion  Gastrointestinal ROS: no abdominal pain, change in bowel habits, or black or bloody stools  Genito-Urinary ROS: positive for right groin pain  Musculoskeletal ROS: negative      PHYSICAL EXAM:    Vitals:    07/16/20 1346   BP: 119/69   Pulse: 93   Resp: 14   Temp: 98 °F (36.7 °C)   SpO2: 96%       General Appearance:  awake, alert, oriented, in no acute distress  Skin:  Skin color, texture, turgor normal. No rashes or lesions. Head/face:  NCAT  Eyes:  No gross abnormalities. Lungs:  Normal expansion. RA. Heart:  Heart regular rate and rhythm  Abdomen:  Soft, non-tender, ND. Port sites are well healed. Bilateral inguinal canals are palpated without evidence of hernia. Mild TTP with palpation of the external ring on the right. No masses. Extremities: Extremities warm to touch, pink, with no edema. Male Rectal:  Rectal exam refused by patient.       ASSESSMENT:  54 y.o. male with hx of right inguinal hernia repair (lap or robotic) performed about 3 years ago at [facility]. Has had pain at his right groin starting a few months after his operation which is most likely entrapment. No recurrence noted on examination today.  Will plan for the following:    PLAN:  - CT pelvis with oral contrast for further evaluation  - recommend NSAID (mobiq) and gabapentin to help control pain  - will schedule f/u to discuss results of CT scan and additional options    Electronically signed by Vani Yan MD on 7/16/20 at 2:24 PM EDT

## 2020-07-16 NOTE — PATIENT INSTRUCTIONS
Dr. Samm Condon recommended the followin. CT scan of the pelvis and groin area to look for internal hernia or other abnormalities accounting for your right groin pain  2. Apply heat (heating pad or warm compresses) to right groin 30 to 45 minutes 4 times a day. 3. Start Mobic 1 tablet by mouth daily (Non-Steroidal Anti-inflammatory medication)  4. Start Gabapentin (Neurotin) 1 tablet at bed time the first day, tablet during the day and 1 tablet at bedtime the second day, then starting the third day, 1 tablet twice during the day and 1 tablet at bedtime  5.  Restart Flexeril 1 tablet by mouth 3 times a day as needed for right groin pain

## 2020-07-16 NOTE — LETTER
Dianelys Louis 43  [address], [place], [address]  [phone] (Fax)    July 16, 9664     Levon Lundborg, [address] [facility] ([facility]3],[address]  [place] [address]    Patient: Jelani Chavez   YOB: 1975   Date of Visit: 1/42/8041       Dear Levon Lundborg: Thank you for referring Jelani Chavez to me for evaluation. Attached is my office note. If you have questions, please do not hesitate to call me. I look forward to following this patient along with you. Sincerely,    Electronically signed by Marcos Morgan MD on 7/16/20 at 3:19 PM EDT                                                                    GENERAL SURGERY  H&P  7/16/2020    CC: right groin pain    HPI  Jelani Chavez is a 54 y.o. male who presents for evaluation after right inguinal hernia repair. The patient presents today for persistent pain at his right groin and possible recurrence of his right inguinal hernia. The patient says that he received a laparoscopic versus robotic right inguinal hernia repair at another instruction approximately 3 years ago. He was told that mesh was used and it was secured in two places. Months after this he started to feel pain at the right groin. He did not notice a bulge at this time. He has had the pain intermittently since that time. He describes the pain at sharp, stabbing in nature. It interferes with his sexual performance, saying that during sexual activity he experiences pain at his right groin which is accompanied by loss of erection. He has this pain at rest and also when rising from sitting. He has been prescribed pain medication - opioid, muscle relaxers, and NSAIDs - which has helped. He denies changes to his bowel movements, which he has daily to two times daily. He denies melenic stools or BRBPR. His appetite is good and has noticed a weight gain of 5 pounds over the last few months. Past Medical History:   Diagnosis Date    Chronic pain     Headache        Past Surgical History:   Procedure Laterality Date    HERNIA REPAIR      1-16-19       Medications Prior to Admission:    Prior to Admission medications    Medication Sig Start Date End Date Taking? Authorizing Provider   ibuprofen (ADVIL;MOTRIN) 800 MG tablet Take 800 mg by mouth every 6 hours as needed for Pain   Yes Historical Provider, MD   cyclobenzaprine (FLEXERIL) 5 MG tablet Take 5 mg by mouth 3 times daily as needed for Muscle spasms    Yes Historical Provider, MD   Cholecalciferol (VITAMIN D) 2000 units CAPS capsule Take by mouth daily   Yes Historical Provider, MD   naproxen (NAPROSYN) 500 MG tablet Take 1 tablet by mouth 2 times daily as needed for Pain  Patient not taking: Reported on 9/30/2019 8/12/19 8/19/19  MARYBEL Mabry - JONATHON   oxyCODONE-acetaminophen (ROXICET) 5-325 MG/5ML solution Take by mouth daily as needed for Pain. Ordered every 6 hours but pt takes once daily .     Historical Provider, MD       No Known Allergies    Family History   Problem Relation Age of Onset    No Known Problems Mother     No Known Problems Father     No Known Problems Brother     No Known Problems Sister        Social History     Tobacco Use    Smoking status: Current Every Day Smoker     Packs/day: 0.50     Years: 25.00     Pack years: 12.50     Types: Cigarettes    Smokeless tobacco: Never Used   Substance Use Topics    Alcohol use: No    Drug use: Yes     Types: Marijuana     Comment: once a week with last smoked 4 days ago         Review of Systems   General ROS: negative  Hematological and Lymphatic ROS: negative  Respiratory ROS: no cough, shortness of breath, or wheezing  Cardiovascular ROS: no chest pain or dyspnea on exertion  Gastrointestinal ROS: no abdominal pain, change in bowel habits, or black or bloody stools  Genito-Urinary ROS: positive for right groin pain  Musculoskeletal ROS: negative      PHYSICAL EXAM: Vitals:    07/16/20 1346   BP: 119/69   Pulse: 93   Resp: 14   Temp: 98 °F (36.7 °C)   SpO2: 96%       General Appearance:  awake, alert, oriented, in no acute distress  Skin:  Skin color, texture, turgor normal. No rashes or lesions. Head/face:  NCAT  Eyes:  No gross abnormalities. Lungs:  Normal expansion. RA. Heart:  Heart regular rate and rhythm  Abdomen:  Soft, non-tender, ND. Port sites are well healed. Bilateral inguinal canals are palpated without evidence of hernia. Mild TTP with palpation of the external ring on the right. No masses. Extremities: Extremities warm to touch, pink, with no edema. Male Rectal:  Rectal exam refused by patient. ASSESSMENT:  54 y.o. male with hx of right inguinal hernia repair (lap or robotic) performed about 3 years ago at [facility]. Has had pain at his right groin starting a few months after his operation which is most likely entrapment. No recurrence noted on examination today. Will plan for the following:    PLAN:  - CT pelvis with oral contrast for further evaluation  - recommend NSAID (mobiq) and gabapentin to help control pain  - restart flexeril TID CA  - will schedule f/u to discuss results of CT scan and additional options    Electronically signed by Laura Whaley MD on 7/16/20 at 2:24 PM EDT    Attending Physician Note  Chief Complaint   Patient presents with    Consultation     Inguinal Hernia consult, ext referral Evelio Drake, [address]    Inguinal Hernia     Right ingainal hernia, pt states he had previous surgery last year at [facility]     History:  The patient is 54 y.o. male. I discussed the case with the resident and I saw and examined the patient with the resident. I reviewed the resident's note and made corrections as appropriate. I reviewed the patient's Past Medical History, Past Surgical History, Medications, and Allergies.     Physical Exam:  Vitals:    07/16/20 1346   BP: 119/69   Site: Right Upper Arm Position: Sitting   Cuff Size: Medium Adult   Pulse: 93   Resp: 14   Temp: 98 °F (36.7 °C)   TempSrc: Infrared   SpO2: 96%   Weight: 150 lb (68 kg)   Height: 5' 10\" (1.778 m)       Body mass index is 21.52 kg/m². Chest: Breath sounds were clear and equal with no rales, wheezes, or rhonchi. Respiratory effort was normal with no retractions or use of accessory muscles. Cardiovascular: Heart sounds were normal with a regular rate and rhythm. There were no murmurs or gallops. Abdomen: Bowel sounds were normal.  The abdomen was soft and non distended. There was 4/10 left lower quadrant and 6/10 right lower quadrant tenderness, guarding, rebound, or rigidity. There was no masses, hepatosplenomegaly, or hernias. Laparoscopic surgical scars noted. Genitourinary: No inguinal hernias were noted on coughing and straining. Penis and testicles normal.  He had mild to moderate tenderness to palpation right pubic tubercle. Impression/Plan:  I reviewed the resident's impression and plan and made corrections as appropriate. 1. Chronic right groin pain - I do not find any evidence of recurrent right inguinal hernia on physical exam.  I will have patient sign medical release and obtain operative note from Ryan Butler with surgical.  I recommended CT scan of the pelvis with IV and oral contrast to rule out a obturator hernia or other causes for his pain. Most likely this is a neuropathic pain related to his previous laparoscopic inguinal hernia. Recommend he apply heat therapy to the right groin for 30 to 45 minutes 4 times a day. I also ordered Mobic 7.5 mg p.o. daily, gabapentin 100 mg p.o. 3 times daily, and restart Flexeril 5 mg p.o. 3 times daily as needed right groin pain. Patient follow-up with me in the office 2 weeks after the CT scan. 2. Lumbar disc disease and spondylosis and facet arthropathy - patient has had nerve blocks in the past for this with some relief.   However did not affect his right groin pain.     Electronically by Marysol Carrera MD  on 7/16/20 at 3:01 PM EDT

## 2020-07-17 ENCOUNTER — TELEPHONE (OUTPATIENT)
Dept: SURGERY | Age: 45
End: 2020-07-17

## 2020-07-17 NOTE — TELEPHONE ENCOUNTER
MA contacted [facility] for prior authorization. PENDING prior authorization for CT Pelvis at [address] [place] [address] in [place], [place]. Reference number [id number]. Clinicals faxed to [phone]. MA scanned authorization form in media tab.     Electronically signed by Maurilio Workman on 7/17/20 at 10:51 AM EDT

## 2020-07-24 ENCOUNTER — HOSPITAL ENCOUNTER (OUTPATIENT)
Dept: CT IMAGING | Age: 45
Discharge: HOME | End: 2020-07-26
Payer: COMMERCIAL

## 2020-07-24 PROCEDURE — 72193 CT PELVIS W/DYE: CPT

## 2020-07-24 PROCEDURE — 6360000004 HC RX CONTRAST MEDICATION: Performed by: RADIOLOGY

## 2020-07-24 RX ORDER — SODIUM CHLORIDE 0.9 % (FLUSH) 0.9 %
10 SYRINGE (ML) INJECTION ONCE
Status: DISCONTINUED | OUTPATIENT
Start: 2020-07-24 | End: 2020-07-27 | Stop reason: HOSPADM

## 2020-07-24 RX ADMIN — IOPAMIDOL 110 ML: 755 INJECTION, SOLUTION INTRAVENOUS at 17:19

## 2020-07-24 RX ADMIN — IOHEXOL 50 ML: 240 INJECTION, SOLUTION INTRATHECAL; INTRAVASCULAR; INTRAVENOUS; ORAL at 17:18

## 2020-07-28 ENCOUNTER — TELEPHONE (OUTPATIENT)
Dept: SURGERY | Age: 45
End: 2020-07-28

## 2020-07-28 NOTE — TELEPHONE ENCOUNTER
Pt already scheduled and completed CT on 7/24/20. MA called pt and scheduled follow up with Dr. Jia Wells on 8/6/20@ 2:15 pm to go over CT. Pt accepted date and time for appointment. CT approval dated 7/17/20-9/17/20 scanned into media.   Electronically signed by Tonya Dean on 7/28/20 at 8:26 AM EDT

## 2020-08-06 ENCOUNTER — OFFICE VISIT (OUTPATIENT)
Dept: SURGERY | Age: 45
End: 2020-08-06
Payer: COMMERCIAL

## 2020-08-06 VITALS
OXYGEN SATURATION: 97 % | RESPIRATION RATE: 16 BRPM | TEMPERATURE: 97.9 F | DIASTOLIC BLOOD PRESSURE: 96 MMHG | WEIGHT: 150 LBS | BODY MASS INDEX: 21.47 KG/M2 | SYSTOLIC BLOOD PRESSURE: 142 MMHG | HEART RATE: 90 BPM | HEIGHT: 70 IN

## 2020-08-06 PROBLEM — M47.816 LUMBAR SPONDYLOSIS: Chronic | Status: ACTIVE | Noted: 2019-02-22

## 2020-08-06 PROBLEM — M47.816 LUMBAR FACET ARTHROPATHY: Chronic | Status: ACTIVE | Noted: 2019-02-22

## 2020-08-06 PROBLEM — G89.4 CHRONIC PAIN SYNDROME: Chronic | Status: ACTIVE | Noted: 2019-02-22

## 2020-08-06 PROCEDURE — G8427 DOCREV CUR MEDS BY ELIG CLIN: HCPCS | Performed by: SURGERY

## 2020-08-06 PROCEDURE — 99212 OFFICE O/P EST SF 10 MIN: CPT | Performed by: SURGERY

## 2020-08-06 PROCEDURE — G8420 CALC BMI NORM PARAMETERS: HCPCS | Performed by: SURGERY

## 2020-08-06 PROCEDURE — 4004F PT TOBACCO SCREEN RCVD TLK: CPT | Performed by: SURGERY

## 2020-08-06 NOTE — LETTER
Dianelys Louis 43  [address], [address]  [phone] (Fax)    August 6, 6888     Geovany Brown, [address] [facility]  [place] [address]    Patient: Arnulfo Andersen. YOB: 1975   Date of Visit: 5/4/4759       Dear Geovany Brown: Thank you for referring Warren Tilley to me for evaluation. Attached is my office note. If you have questions, please do not hesitate to call me. Sincerely,    Electronically signed by Hector Malloy MD on 8/6/20 at 2:56 PM EDT                                                                    General Surgery Progress Note  Date: 8/6/2020     Chief Complaint   Patient presents with    Follow Up After Procedure     follow up ct     History:  The patient is 54 y.o. male who I saw in the office on 7/16/2020 for chronic right groin pain with history of laparoscopic right inguinal hernia repair on 1/16/2019. On examination the patient was not found to have any sign of recurrent hernia. I thought his symptoms were secondary to neuropathy. I started him on Neurontin 300 mg p.o. 3 times daily, Mobic 7.5 mg p.o. daily, and Flexeril 5 mg p.o. 3 times daily as needed groin pain. Also recommend CT scan of the pelvis to rule out a recurrent inguinal hernia or an obturator hernia. Patient returns today for follow-up. Patient had CT scan of the pelvis with IV and oral contrast on 7/24/2020. This revealed no evidence for obturator hernia or sign of recurrent right inguinal hernia. Today, patient states he has been following above regimen and is actually taking the Mobic 1 tablet p.o. twice daily. However, his right groin pain is not any better and may be slightly worse. Prior to Admission medications    Medication Sig Start Date End Date Taking?  Authorizing Provider   cyclobenzaprine (FLEXERIL) 5 MG tablet Take 1 tablet by mouth 3 times had nerve blocks in the past for this with some relief.   However did not affect his right groin pain    Electronically by Masha Johnson MD  on 8/6/2020 at 2:28 PM

## 2020-08-06 NOTE — PROGRESS NOTES
General Surgery Progress Note  Date: 8/6/2020     Chief Complaint   Patient presents with    Follow Up After Procedure     follow up ct     History:  The patient is 54 y.o. male who I saw in the office on 7/16/2020 for chronic right groin pain with history of laparoscopic right inguinal hernia repair on 1/16/2019. On examination the patient was not found to have any sign of recurrent hernia. I thought his symptoms were secondary to neuropathy. I started him on Neurontin 300 mg p.o. 3 times daily, Mobic 7.5 mg p.o. daily, and Flexeril 5 mg p.o. 3 times daily as needed groin pain. Also recommend CT scan of the pelvis to rule out a recurrent inguinal hernia or an obturator hernia. Patient returns today for follow-up. Patient had CT scan of the pelvis with IV and oral contrast on 7/24/2020. This revealed no evidence for obturator hernia or sign of recurrent right inguinal hernia. Today, patient states he has been following above regimen and is actually taking the Mobic 1 tablet p.o. twice daily. However, his right groin pain is not any better and may be slightly worse. Prior to Admission medications    Medication Sig Start Date End Date Taking? Authorizing Provider   cyclobenzaprine (FLEXERIL) 5 MG tablet Take 1 tablet by mouth 3 times daily as needed for Muscle spasms 7/16/20  Yes Rey Ruano MD   gabapentin (NEURONTIN) 100 MG capsule Take 1 capsule by mouth 3 times daily for 180 days.  Intended supply: 90 days 7/16/20 1/12/21 Yes Rey Ruano MD   meloxicam [facility]) 7.5 MG tablet Take 1 tablet by mouth daily 7/16/20  Yes Rey Ruano MD   Cholecalciferol (VITAMIN D) 2000 units CAPS capsule Take by mouth daily   Yes Historical Provider, MD       No Known Allergies    ROS:  As noted in HPI above    Physical Exam:  BP (!) 142/96 (Site: Right Upper Arm, Position: Sitting, Cuff Size: Large Adult)   Pulse 90   Temp 97.9 °F (36.6 °C) (Infrared)   Resp 16   Ht 5' 10\" (1.778 m)   Wt 150 lb (68 kg)   SpO2 97%   BMI 21.52 kg/m²     Chest: Breath sounds were clear and equal with no rales, wheezes, or rhonchi. Respiratory effort was normal with no retractions or use of accessory muscles. Cardiovascular: Heart sounds were normal with a regular rate and rhythm. There were no murmurs or gallops. Abdomen: Bowel sounds were normal.  The abdomen was soft and non distended. There was 3/10 tenderness in the upper abdomen and mild tenderness in the right lower quadrant with no guarding, rebound, or rigidity. There was no masses, hepatosplenomegaly, or hernias. Genitourinary: No inguinal hernias were noted on coughing and straining. Impression/Plan:  1. Chronic right groin pain following laparoscopic right inguinal herniorrhaphy - I reviewed the findings of the CT scan with the patient. Also told him that on repeat exam today I do not find any sign of recurrent right inguinal hernia. I still think the patient's pain is due to neuropathy. Recommend he continue on the current medications. I recommend referral to the pain clinic for possible local injection. Patient is in agreement with this. 2. Lumbar disc disease and spondylosis and facet arthropathy - patient has had nerve blocks in the past for this with some relief.   However did not affect his right groin pain    Electronically by Marcelle Carr MD  on 8/6/2020 at 2:28 PM

## 2020-08-06 NOTE — PATIENT INSTRUCTIONS
Dr. Eufemia Bashir recommended referral to Pain Clinic for further evaluation and treatment of chronic right groin pain. Continue Mobic, Neurotin, and Flexeril for now.

## 2020-08-26 ENCOUNTER — OFFICE VISIT (OUTPATIENT)
Dept: PAIN MANAGEMENT | Age: 45
End: 2020-08-26
Payer: COMMERCIAL

## 2020-08-26 VITALS
TEMPERATURE: 97.8 F | HEIGHT: 70 IN | DIASTOLIC BLOOD PRESSURE: 60 MMHG | WEIGHT: 149 LBS | SYSTOLIC BLOOD PRESSURE: 102 MMHG | HEART RATE: 72 BPM | RESPIRATION RATE: 16 BRPM | BODY MASS INDEX: 21.33 KG/M2

## 2020-08-26 PROBLEM — G57.91 ILIOINGUINAL NEURALGIA OF RIGHT SIDE: Status: ACTIVE | Noted: 2020-08-26

## 2020-08-26 PROBLEM — M79.2 NEURALGIA AND NEURITIS: Status: ACTIVE | Noted: 2020-08-26

## 2020-08-26 PROCEDURE — 99214 OFFICE O/P EST MOD 30 MIN: CPT | Performed by: PAIN MEDICINE

## 2020-08-26 PROCEDURE — G8427 DOCREV CUR MEDS BY ELIG CLIN: HCPCS | Performed by: PAIN MEDICINE

## 2020-08-26 PROCEDURE — 4004F PT TOBACCO SCREEN RCVD TLK: CPT | Performed by: PAIN MEDICINE

## 2020-08-26 PROCEDURE — 99213 OFFICE O/P EST LOW 20 MIN: CPT | Performed by: PAIN MEDICINE

## 2020-08-26 PROCEDURE — G8420 CALC BMI NORM PARAMETERS: HCPCS | Performed by: PAIN MEDICINE

## 2020-08-26 NOTE — PROGRESS NOTES
Do you currently have any of the following:    Fever: No  Headache:  No  Cough: No  Shortness of breath: No  Exposed to anyone with these symptoms: No                                                                                                                Peter Marti. presents to the [facility] on 8/26/2020. Denis Armendariz is complaining of pain groin right side. The pain is constant. The pain is described as aching, throbbing, dull, sharp and electrical shock. Pain is rated on his best day at a 6, on his worst day at a 8, and on average at a 6 on the VAS scale. He took his last dose of Neurontin and flexeril and mobic yesterday . Denis Armendariz does not have issues with constipation. Any procedures since your last visit: No, with  % relief. He is not on NSAIDS and  is not on anticoagulation medications to include none and is managed by . Pacemaker or defibrilator: No Physician managing device is . /60   Pulse 72   Temp 97.8 °F (36.6 °C)   Resp 16   Ht 5' 10\" (1.778 m)   Wt 149 lb (67.6 kg)   BMI 21.38 kg/m²      No LMP for male patient.

## 2020-08-26 NOTE — PROGRESS NOTES
[facility]  [address] [facility], [address] [place] [address] [place]  [phone]    Follow up Note      Cheryl Wilde. Date of Visit:  8/26/2020    CC:  Patient presents for follow up   Chief Complaint   Patient presents with    Follow-up     groin right side       HPI:  Patient is here to re-establish care with the practice with complaints of right lower quadrant pain that started after hernia repair surgery. He was last seen by me 02/2019 for his low back pain. Pain is described as sharp radiating down to th right side of his scrotum  Appropriate analgesia with current medications regimen:N/A   Medication side effects:not applicable . Recent diagnostic testing:abdominal CT  Recent interventional procedures:none. .    He has not been on anticoagulation medications to include none and has not been on herbal supplements. He is not diabetic.     Imaging:   Lumbar spine MRI 01/2019   Rotatory levoconvex scoliosis with multilevel disc and facet disease most significantly in the lower lumbar spine   There is multilevel acquired foraminal stenosis most notably severe right and moderate Left foraminal stenosis at L4-5 moderate to severe bilateral foraminal stenosis at L5-S1 and moderate bilateral foraminal stenosis at L4-5    CT abdomen 2020: There is mild osteophytosis and eburnation of sacroiliac and    acetabular articulating surfaces. There appears to be degenerative    spondylosis of the lower lumbar spine. Arterial calcifications are    present. There is no evidence for inguinal or femoral hernia. There is    no abnormal bowel or fat between the obturator externus and the    pectineus muscle. The remainder of the regional soft tissue and    osseous structures are unremarkable.       Previous treatments: Physical Therapy, injections at [facility] and medications. .         Potential Aberrant Drug-Related Behavior:    No    Urine Drug Screening:  First office visit urine screen showed Oxycodone consistent with his OARRS report    OARRS report:  08/2020 consistent. Past Medical History:   Diagnosis Date    Chronic pain     Headache      Past Surgical History:   Procedure Laterality Date    INGUINAL HERNIA REPAIR  2017    laparoscopic right inguinal herina repair with mesh, [facility] surgical      Prior to Admission medications    Medication Sig Start Date End Date Taking? Authorizing Provider   cyclobenzaprine (FLEXERIL) 5 MG tablet Take 1 tablet by mouth 3 times daily as needed for Muscle spasms 7/16/20  Yes Sonny Alfonso MD   gabapentin (NEURONTIN) 100 MG capsule Take 1 capsule by mouth 3 times daily for 180 days.  Intended supply: 90 days 7/16/20 1/12/21 Yes Sonny Alfonso MD   meloxicam [facility]) 7.5 MG tablet Take 1 tablet by mouth daily 7/16/20  Yes Sonny Alfonso MD   Cholecalciferol (VITAMIN D) 2000 units CAPS capsule Take by mouth daily   Yes Historical Provider, MD     No Known Allergies    Social History     Socioeconomic History    Marital status: Single     Spouse name: Not on file    Number of children: Not on file    Years of education: Not on file    Highest education level: Not on file   Occupational History    Not on file   Social Needs    Financial resource strain: Not on file    Food insecurity     Worry: Not on file     Inability: Not on file    Transportation needs     Medical: Not on file     Non-medical: Not on file   Tobacco Use    Smoking status: Current Every Day Smoker     Packs/day: 0.50     Years: 25.00     Pack years: 12.50     Types: Cigarettes    Smokeless tobacco: Never Used   Substance and Sexual Activity    Alcohol use: No    Drug use: Yes     Types: Marijuana     Comment: once a week with last smoked 4 days ago    Sexual activity: Not on file   Lifestyle    Physical activity     Days per week: Not on file     Minutes per session: Not on file    Stress: Not on file   Relationships    Social connections     Talks on phone: Not on file Gets together: Not on file     Attends [employer] service: Not on file     Active member of club or organization: Not on file     Attends meetings of clubs or organizations: Not on file     Relationship status: Not on file    Intimate partner violence     Fear of current or ex partner: Not on file     Emotionally abused: Not on file     Physically abused: Not on file     Forced sexual activity: Not on file   Other Topics Concern    Not on file   Social History Narrative    Not on file     Family History   Problem Relation Age of Onset    No Known Problems Mother     No Known Problems Father     No Known Problems Brother     No Known Problems Sister      REVIEW OF SYSTEMS:     Brian Dillan denies fever/chills, chest pain, shortness of breath, new bowel or bladder complaints. All other review of systems was negative. PHYSICAL EXAMINATION:      /60   Pulse 72   Temp 97.8 °F (36.6 °C)   Resp 16   Ht 5' 10\" (1.778 m)   Wt 149 lb (67.6 kg)   BMI 21.38 kg/m²     General:      General appearance:   pleasant and well-hydrated. , in mild discomfort and A & O x3  Build:Normal Weight    HEENT:    Head:normocephalic and atraumatic  Sclera: icterus absent,    Lungs:    Breathing:Breathing Pattern: regular, no distress    Abdomen:    Shape:non-distended and normal  Tenderness:RLQ and suprapubic    Lumbar spine:    Spine inspection:normal   CVA tenderness:No   Palpation:tenderness paravertebral muscles, facet loading, right, positive and tenderness. Range of motion:abnormal moderately Lateral bending, flexion, extension rotation right and is  painful.     Musculoskeletal:    Trigger points in Paraveteral:absent bilaterally  SI joint tenderness:negative right, negative left              DELPHINE test:negative right, negative             left  Piriformis tenderness:negative right, negative left  Trochanteric bursa tenderness:negative right, negative left  SLR:negative right, negative left, sitting about age related risk factors and have thoroughly discussed the importance of taking these medications as prescribed. The patient verbalizes understanding. ccrefhenrying demetris Mckeon M.D.

## 2020-08-31 ENCOUNTER — OFFICE VISIT (OUTPATIENT)
Dept: PAIN MANAGEMENT | Age: 45
End: 2020-08-31
Payer: COMMERCIAL

## 2020-08-31 VITALS
RESPIRATION RATE: 16 BRPM | BODY MASS INDEX: 21.47 KG/M2 | HEIGHT: 70 IN | DIASTOLIC BLOOD PRESSURE: 68 MMHG | OXYGEN SATURATION: 97 % | WEIGHT: 150 LBS | SYSTOLIC BLOOD PRESSURE: 104 MMHG | HEART RATE: 94 BPM | TEMPERATURE: 98.9 F

## 2020-08-31 PROCEDURE — 64425 NJX AA&/STRD II IH NERVES: CPT | Performed by: PAIN MEDICINE

## 2020-08-31 PROCEDURE — 6360000002 HC RX W HCPCS

## 2020-08-31 PROCEDURE — 76942 ECHO GUIDE FOR BIOPSY: CPT | Performed by: PAIN MEDICINE

## 2020-08-31 RX ORDER — BUPIVACAINE HYDROCHLORIDE 2.5 MG/ML
5 INJECTION, SOLUTION INFILTRATION; PERINEURAL ONCE
Status: COMPLETED | OUTPATIENT
Start: 2020-08-31 | End: 2020-08-31

## 2020-08-31 RX ORDER — METHYLPREDNISOLONE ACETATE 40 MG/ML
40 INJECTION, SUSPENSION INTRA-ARTICULAR; INTRALESIONAL; INTRAMUSCULAR; SOFT TISSUE ONCE
Status: COMPLETED | OUTPATIENT
Start: 2020-08-31 | End: 2020-08-31

## 2020-08-31 RX ADMIN — BUPIVACAINE HYDROCHLORIDE 25 MG: 2.5 INJECTION, SOLUTION INFILTRATION; PERINEURAL at 12:56

## 2020-08-31 RX ADMIN — METHYLPREDNISOLONE ACETATE 40 MG: 40 INJECTION, SUSPENSION INTRA-ARTICULAR; INTRALESIONAL; INTRAMUSCULAR; SOFT TISSUE at 12:58

## 2020-08-31 NOTE — PROGRESS NOTES
8/31/2020    Patient Active Problem List   Diagnosis    Chronic pain syndrome    Lumbar facet arthropathy    Lumbar spondylosis    Lumbar disc disorder    Right lower quadrant abdominal tenderness without rebound tenderness    Ilioinguinal neuralgia of right side    Neuralgia and neuritis       Allergies as of 08/31/2020    (No Known Allergies)        Procedure: Right ilioinguinal nerve block       Blocks:  Right ilioinguinal    yes consent signed yes procedure verified with patient  yes allergies noted yes pt confirms not pregnant    Patient rates pain a 5/10 on the VAS scale. Skin Prep:  chlora prep    Anesthetic:  marcaine    Medication:  Depo medrol    Vitals:    08/31/20 1010   BP: 110/80   Pulse: 94   Resp: 16   Temp: 98.9 °F (37.2 °C)   TempSrc: Infrared   SpO2: 97%   Weight: 150 lb (68 kg)   Height: 5' 10\" (1.778 m)       I witnessed patient signing consent to Medical Procedure and Treatment form.      [address]

## 2020-08-31 NOTE — PROGRESS NOTES
2020    Patient: Michelle Adame. :  1975  Age:  54 y.o. Sex:  male     PRE-PROCEDURE DIAGNOSIS: Right   Ilioinguinal neuritis. POST-PROCEDURE DIAGNOSIS: Same. PROCEDURE: Right Ilioinguinal nerve block under ultrasound guidance (TAP Block). SURGEON:  SREE Arnold M.D. ANESTHESIA: Local    ESTIMATED BLOOD LOSS:  None.  ______________________________________________________________________    BRIEF HISTORY:  Michelle Adame. comes in today for Right ilioinguinal nerve block under ultrasound guidance. The potential complications of as well as the procedure in detail were explained to him today. The patient has elected to undergo the aforementioned procedure. Manuels complete History & Physical examination were reviewed in depth, a copy of which is in the chart. DESCRIPTION OF PROCEDURE:   After confirming written and informed consent, the patient was placed in the prone position. The area of the Right abdomen between the lower costal border and the iliac crest was prepped with chloraprep  and draped in usual sterile manner. The plane between the internal oblique and transverse abdominus muscle was locate was located by ultrasound visualization. The overlying skin and subcutaneous tissue were anesthetized with 0.5% Lidocaine. Utilizing an out of plane approach, a #22-gauge 3 1/2-inch spinal needle was inserted under direct ultrasound visualization until the needle was noted into the appropriate plane. The needle tip was verified to be in the appropriate plane with visualization of the appropriate spread of a solution of 0.25% marcaine 5 cc and 40 mg DepoMedrol, following a negative aspiration, within the appropriate plane. The needle was then removed and a Band-Aid was applied. Disposition the patient tolerated the procedure well and there were no complications . Abby Stanford will follow up in our [facility] as scheduled.  He was encouraged to call with questions, concerns or if worsening of symptoms occurs.     Terese Zamudio MD

## 2020-09-23 ENCOUNTER — OFFICE VISIT (OUTPATIENT)
Dept: PAIN MANAGEMENT | Age: 45
End: 2020-09-23
Payer: COMMERCIAL

## 2020-09-23 VITALS
WEIGHT: 165 LBS | OXYGEN SATURATION: 98 % | TEMPERATURE: 98.6 F | BODY MASS INDEX: 23.62 KG/M2 | HEART RATE: 64 BPM | HEIGHT: 70 IN | SYSTOLIC BLOOD PRESSURE: 110 MMHG | DIASTOLIC BLOOD PRESSURE: 72 MMHG | RESPIRATION RATE: 18 BRPM

## 2020-09-23 PROCEDURE — 99213 OFFICE O/P EST LOW 20 MIN: CPT | Performed by: PAIN MEDICINE

## 2020-09-23 PROCEDURE — 4004F PT TOBACCO SCREEN RCVD TLK: CPT | Performed by: PAIN MEDICINE

## 2020-09-23 PROCEDURE — G8420 CALC BMI NORM PARAMETERS: HCPCS | Performed by: PAIN MEDICINE

## 2020-09-23 PROCEDURE — G8427 DOCREV CUR MEDS BY ELIG CLIN: HCPCS | Performed by: PAIN MEDICINE

## 2020-09-23 NOTE — PROGRESS NOTES
[facility]  [address] [facility], [address] [place] [address]  [phone]    Follow up Note      Kristi Wells. Date of Visit:  9/23/2020    CC:  Patient presents for follow up   Chief Complaint   Patient presents with    Pain     side      HPI:  Follow up on his low back and right lower quadrant pain with no acute issues. .  Appropriate analgesia with current medications regimen:N/A   Medication side effects:none  Recent diagnostic testing:none. Recent interventional procedures:Right Ilioinguinal nerve block under ultrasound excellent relief    He has not been on anticoagulation medications to include none and has not been on herbal supplements. He is not diabetic.     Imaging:   Lumbar spine MRI 01/2019   Rotatory levoconvex scoliosis with multilevel disc and facet disease most significantly in the lower lumbar spine   There is multilevel acquired foraminal stenosis most notably severe right and moderate Left foraminal stenosis at L4-5 moderate to severe bilateral foraminal stenosis at L5-S1 and moderate bilateral foraminal stenosis at L4-5    CT abdomen 2020: There is mild osteophytosis and eburnation of sacroiliac and    acetabular articulating surfaces. There appears to be degenerative    spondylosis of the lower lumbar spine. Arterial calcifications are    present. There is no evidence for inguinal or femoral hernia. There is    no abnormal bowel or fat between the obturator externus and the    pectineus muscle. The remainder of the regional soft tissue and    osseous structures are unremarkable.       Previous treatments: Physical Therapy, injections at [facility] and medications. .         Potential Aberrant Drug-Related Behavior:    No    Urine Drug Screening:  First office visit urine screen showed Oxycodone consistent with his OARRS report    OARRS report:  09/2020 consistent.     Past Medical History:   Diagnosis Date    Chronic pain     Headache      Past Surgical History:   Procedure Laterality Date    INGUINAL HERNIA REPAIR  2017    laparoscopic right inguinal herina repair with mesh, [facility]      Prior to Admission medications    Medication Sig Start Date End Date Taking? Authorizing Provider   cyclobenzaprine (FLEXERIL) 5 MG tablet Take 1 tablet by mouth 3 times daily as needed for Muscle spasms 7/16/20  Yes Aneta Lesches, MD   gabapentin (NEURONTIN) 100 MG capsule Take 1 capsule by mouth 3 times daily for 180 days.  Intended supply: 90 days 7/16/20 1/12/21 Yes Aneta Lesches, MD   meloxicam [facility]) 7.5 MG tablet Take 1 tablet by mouth daily 7/16/20  Yes Aneta Lesches, MD   Cholecalciferol (VITAMIN D) 2000 units CAPS capsule Take by mouth daily   Yes Historical Provider, MD     No Known Allergies    Social History     Socioeconomic History    Marital status: Single     Spouse name: Not on file    Number of children: Not on file    Years of education: Not on file    Highest education level: Not on file   Occupational History    Not on file   Social Needs    Financial resource strain: Not on file    Food insecurity     Worry: Not on file     Inability: Not on file    Transportation needs     Medical: Not on file     Non-medical: Not on file   Tobacco Use    Smoking status: Current Every Day Smoker     Packs/day: 0.50     Years: 25.00     Pack years: 12.50     Types: Cigarettes    Smokeless tobacco: Never Used   Substance and Sexual Activity    Alcohol use: No    Drug use: Yes     Types: Marijuana     Comment: once a week with last smoked 4 days ago    Sexual activity: Not on file   Lifestyle    Physical activity     Days per week: Not on file     Minutes per session: Not on file    Stress: Not on file   Relationships    Social connections     Talks on phone: Not on file     Gets together: Not on file     Attends Yazidi service: Not on file     Active member of club or organization: Not on file     Attends meetings of clubs or organizations: Not on file     Relationship status: Not on file    Intimate partner violence     Fear of current or ex partner: Not on file     Emotionally abused: Not on file     Physically abused: Not on file     Forced sexual activity: Not on file   Other Topics Concern    Not on file   Social History Narrative    Not on file     Family History   Problem Relation Age of Onset    No Known Problems Mother     No Known Problems Father     No Known Problems Brother     No Known Problems Sister      REVIEW OF SYSTEMS:     Nicki Luevano denies fever/chills, chest pain, shortness of breath, new bowel or bladder complaints. All other review of systems was negative. PHYSICAL EXAMINATION:      /72   Pulse 64   Temp 98.6 °F (37 °C)   Resp 18   Ht 5' 10\" (1.778 m)   Wt 165 lb (74.8 kg)   SpO2 98%   BMI 23.68 kg/m²     General:      General appearance:   pleasant and well-hydrated. , in no discomfort and A & O x3  Build:Normal Weight    HEENT:    Head:normocephalic and atraumatic  Sclera: icterus absent,    Lungs:    Breathing:Breathing Pattern: regular, no distress    Abdomen:    Shape:non-distended and normal  Tenderness:mild right RLQ tenderness. Lumbar spine:    Spine inspection:normal   CVA tenderness:No   Palpation:tenderness paravertebral muscles, facet loading, right, positive and tenderness. Range of motion:abnormal moderately Lateral bending, flexion, extension rotation right and is  painful. Musculoskeletal:    Trigger points in Paraveteral:absent bilaterally  SI joint tenderness:negative right, negative left              DELPHINE test:negative right, negative left  SLR:negative right, negative left, sitting     Extremities:    Tremors:None bilaterally upper and lower  Range of motion:pain with internal rotation of hips negative.   Intact:Yes  Edema:Normal    Neurological:    Sensory:normal to light touch bilateral lower extremities  Motor:                 Right Quadriceps5/5          Left Quadriceps5/5           Right Gastrocnemius5/5    Left Gastrocnemius5/5  Right Ant Tibialis5/5  Left Ant Tibialis5/5  Reflexes:    Right Quadriceps reflex2+  Left Quadriceps reflex2+  Right Achilles reflex2+  Left Achilles reflex2+  Gait:normal    Dermatology:    Skin:no unusual rashes and no skin lesions    Impression:    Chronic low back pain with no radiculopathy, axial on exam with positive facet tenderness  Lumbar spine MRI 01/2019 multilevel disc and facet disease  Patient had seen [facility] in the past and had interventional procedures with them  Plan:  Follow up on his right lower quadrant abdominal pain with no acute issues. Patient is s/p right ilioinguinal nerve block with good outcome. Will repeat as neededthat started after a hernia repair surgery. Currently on Gabapentin 300 mg TID. Continue with OTC pain medications   OARRS report reviewed 092020. Patient encouraged to stay active. Treatment plan discussed with the patient. We discussed with the patient that combining opioids, benzodiazepines, alcohol, illicit drugs or sleep aids increases the risk of respiratory depression including death. We discussed that these medications may cause drowsiness, sedation or dizziness and have counseled the patient not to drive or operate machinery. We have discussed that these medications will be prescribed only by one provider. We have discussed with the patient about age related risk factors and have thoroughly discussed the importance of taking these medications as prescribed. The patient verbalizes understanding. radames Tipton M.D.

## 2020-09-23 NOTE — PROGRESS NOTES
Do you currently have any of the following:    Fever: No  Headache:  No  Cough: No  Shortness of breath: No  Exposed to anyone with these symptoms: No                                                                                                                Amy Card. presents to the [address] [address] on 9/23/2020. Charline Adkins is complaining of pain side . The pain is much better since injection . The pain is described as aching. Pain is rated on his best day at a 0, on his worst day at a 0, and on average at a 0 on the VAS scale. He took his last dose of Neurontin . Charline Adkins does not have issues with constipation. Any procedures since your last visit: Yes, with 100 % relief. He is not on NSAIDS and  is not on anticoagulation medication  Pacemaker or defibrilator: No Physician managing device is none. /72   Pulse 64   Temp 98.6 °F (37 °C)   Resp 18   Ht 5' 10\" (1.778 m)   Wt 165 lb (74.8 kg)   SpO2 98%   BMI 23.68 kg/m²      No LMP for male patient.

## 2020-11-03 PROBLEM — M47.816 LUMBAR SPONDYLOSIS: Status: RESOLVED | Noted: 2019-02-22 | Resolved: 2020-11-03

## 2021-09-13 ENCOUNTER — HOSPITAL ENCOUNTER (OUTPATIENT)
Dept: PHYSICAL THERAPY | Age: 46
Setting detail: THERAPIES SERIES
Discharge: HOME | End: 2021-09-13
Payer: COMMERCIAL

## 2021-09-13 PROCEDURE — 97162 PT EVAL MOD COMPLEX 30 MIN: CPT
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
## 2021-09-13 NOTE — PROGRESS NOTES
[address] [facility]                Phone: [phone]   Fax: [phone]    Physical Therapy Daily Treatment Note  Date:  2021    Patient Name:  Azael Hill.     :  1975  MRN: [id number]    Referring Physician:  Maria Luisa Jerome MD  Insurance Information:  [facility]      Evaluation date:  2021  Diagnosis:  LBP  Cert Dates:  TBD   Codes:  M54.5  Evaluating Physical Therapist:  Randie Dubin, PT, DPT        Visit:       [address] [facility] RE-ASSESSMENT FORM      Check of Management Strategies:     Compliance / Commitment  [] Excellent   [] Good   [] Fair   [] Poor    Posture Correction: []Yes   [] No    Performing Exercises:  []Yes   [] No    Frequency: [] Appropriate [] Not appropriate                        Symptom Response during  exercises:  [] Increase   [] Decrease   [] No  effect     Technique: [] Good  [] Needs correcting    Comments:        Symptomatic Presentation:    Pain Location: [] Centralised     [] Same    [] Peripheralized               Description:      Frequency: []Better    []Same   []Worse    Severity: /10         Functional Status:  % improvement since initial assessment:  %    Exercises:     Exercise  During After  Comments    THAIS NAVA               Press-ups                                                                       Mechanical Presentation:    Sitting Posture: []Good   []Fair  []Poor                  Standing Posture:  []Good   []Fair  []Poor      Deformity: [] Yes    [] No   [] Not applicable                  Neurological Testing:  [] Better    [] Same   [] Worse    [] NA     Movement Loss: [] Better    [] Same   [] Worse      Repeated Movements:   [] Better    [] Same   [] Worse          SUMMARY: [] Better    [] Same   [] Worse                    Classification Confirmed   []  Yes     [] No    Comments:      Revised Classification (if appropriate):    [] Derangement   [] Dysfunction   [] Posture [] OTHER (subgroup)    Management Today:   [x] Posture      [x] HEP instruction     [x] Exercise  9/13/2021 - posture/use of lumbar roll, avoid flexion, THAIS vs BRANDY 3x10 every 2-3 hours (instruction handout administered)       Plan for next session:  Reassess pt's response to HEP and progress with extension principle as indicated.         Barriers to Recovery:  Chronic LBP; job duties (involves a lot of forward flexion)        CPT codes 9/13/2021 Units  Minutes   Low Complexity PT evaluation  10420     Moderate Complexity PT evaluation  66325     High Complexity PT evaluation [id number]     PT Re-evaluation  [id number]     Gait training [id number]     Manual therapy  [address]     Therapeutic activities  [phone]     Therapeutic exercises  49603     Neuromuscular reeducation  [address]                                                                                                                                                                       Time In:    Time Out:        Solange Matias, PT, DPT   [id number]

## 2021-09-17 ENCOUNTER — HOSPITAL ENCOUNTER (OUTPATIENT)
Dept: PHYSICAL THERAPY | Age: 46
Setting detail: THERAPIES SERIES
Discharge: HOME | End: 2021-09-17
Payer: COMMERCIAL

## 2021-09-17 PROCEDURE — 97530 THERAPEUTIC ACTIVITIES: CPT

## 2021-09-17 PROCEDURE — 97110 THERAPEUTIC EXERCISES: CPT

## 2021-09-17 NOTE — PROGRESS NOTES
[address] [facility]                Phone: [phone]   Fax: [phone]    Physical Therapy Daily Treatment Note  Date:  2021    Patient Name:  J Carlos Petty. :  1975  MRN: [id number]    Referring Physician:  Ban Pimentel MD  Insurance Information:  [facility]      Evaluation date:  2021  Diagnosis:  LBP  Cert Dates:  TBD  EUX-97 Codes:  M54.5  Evaluating Physical Therapist:  Ben Perez PT, DPT        Visit:       [address] RE-ASSESSMENT FORM      Check of Management Strategies:     Compliance / Commitment  [] Excellent   [x] Good   [] Fair   [] Poor    Posture Correction: [x]Yes   [] No    Performing Exercises:  [x]Yes   [] No    Frequency: [x] Appropriate [] Not appropriate                        Symptom Response during  exercises:  [] Increase   [] Decrease   [] No  effect     Technique: [x] Good  [] Needs correcting    Comments:  Pt stated he has also been avoiding bending forward as much as possible while at work.         Symptomatic Presentation:    Pain Location: [] Centralised     [x] Same    [] Peripheralized               Description: across LB     Frequency: []Better    [x]Same   []Worse    Severity: 6/10         Functional Status:  % improvement since initial assessment:  %    Exercises:     Exercise  During After  Comments    THAIS 2x10              BRANDY               Press-ups 7x10 D NB            Ambulation - PRN to assess LBP                                                         Mechanical Presentation:    Sitting Posture: []Good   [x]Fair  []Poor                  Standing Posture:  []Good   [x]Fair  []Poor      Deformity: [] Yes    [] No   [x] Not applicable                  Neurological Testing:  [] Better    [] Same   [] Worse    [x] NA     Movement Loss: [x] Better    [] Same   [] Worse      Repeated Movements:   [x] Better    [] Same   [] Worse          SUMMARY: [x] Better    [] Same   [] Worse Classification Confirmed   []  Yes     [] No    Comments:  Pt given verbal cues for proper form with press-ups and to increase lumbar spine ROM. Great deal of time spent educating pt on extension principle this morning; PT answered all pt's questions as able. By end of session, pt reported pain had centralized and had decreased slightly to 4-5/10. Pt educated on importance of compliance and consistency with HEP; pt verbalized understanding. Revised Classification (if appropriate):    [] Derangement   [] Dysfunction   [] Posture           [] OTHER (subgroup)    Management Today:   [x] Posture      [x] HEP instruction     [x] Exercise  9/13/2021 - posture/use of lumbar roll, avoid flexion, THAIS vs BRANDY 3x10 every 2-3 hours (instruction handout administered)   9/17/2021 - press-ups 3x10 every 3 hours (BRANDY while at work)      Plan for next session:  Reassess pt's response to HEP and progress with extension principle as indicated.         Barriers to Recovery:  Chronic LBP; job duties (involves a lot of forward flexion)        CPT codes 9/17/2021 Units  Minutes   Low Complexity PT evaluation  23147     Moderate Complexity PT evaluation  79345     High Complexity PT evaluation [id number]     PT Re-evaluation  [id number]     Gait training [id number]     Manual therapy  94011     Therapeutic activities  84970 1    Therapeutic exercises  58602 2    Neuromuscular reeducation  [id number]                                                                                                                                                                       Time In:  4178  Time Out:  [address], PT, DPT   [id number]

## 2021-10-11 ENCOUNTER — HOSPITAL ENCOUNTER (OUTPATIENT)
Dept: PHYSICAL THERAPY | Age: 46
Setting detail: THERAPIES SERIES
Discharge: HOME | End: 2021-10-11
Payer: COMMERCIAL

## 2021-10-11 PROCEDURE — 97530 THERAPEUTIC ACTIVITIES: CPT

## 2021-10-11 PROCEDURE — 97110 THERAPEUTIC EXERCISES: CPT

## 2021-10-11 NOTE — PROGRESS NOTES
[address] [facility]                Phone: [phone]   Fax: [phone]    Physical Therapy Daily Treatment Note  Date:  10/11/2021    Patient Name:  Alverto Grace. :  1975  MRN: [id number]    Referring Physician:  Sarkis Bender MD  Insurance Information:  [facility]      Evaluation date:  2021  Diagnosis:  LBP  Cert Dates:  TBD  THANIA-51 Codes:  M54.5  Evaluating Physical Therapist:  Edwin Carbajal PT, DPT        Visit: 3/6-8      [address] [facility] RE-ASSESSMENT FORM      Check of Management Strategies:     Compliance / Commitment  [] Excellent   [] Good   [x] Fair   [] Poor    Posture Correction: [x]Yes   [] No    Performing Exercises:  [x]Yes   [] No    Frequency: [] Appropriate [x] Not appropriate                        Symptom Response during  exercises:  [] Increase   [x] Decrease   [] No  effect     Technique: [x] Good  [] Needs correcting    Comments:  Pt stated he has been avoiding flexion as much as possible while at work. Pt stated his pain seems to be worse when he is relaxing, playing video games, and/or watching TV.         Symptomatic Presentation:    Pain Location: [] Centralised     [x] Same    [] Peripheralized               Description: R LB     Frequency: []Better    [x]Same   []Worse    Severity: 8/10         Functional Status:  % improvement since initial assessment:  %    Exercises:     Exercise  During After  Comments   NT THAIS 2x10              BRANDY               Press-ups with exhale 3x10              Ambulation - PRN to assess LBP              Static prone positioning - elevated head of mat table x5:00                                           Mechanical Presentation:    Sitting Posture: []Good   [x]Fair  []Poor                  Standing Posture:  []Good   [x]Fair  []Poor      Deformity: [] Yes    [] No   [x] Not applicable                  Neurological Testing:  [] Better    [] Same   [] Worse    [x] NA     Movement Loss: [x] Better    [] Same   [] Worse      Repeated Movements:   [x] Better    [] Same   [] Worse          SUMMARY: [x] Better    [] Same   [] Worse                    Classification Confirmed   []  Yes     [] No    Comments:  Pt given verbal cues for proper form with press-ups and to increase lumbar spine ROM with exhale. Pt reported 0/10 while lying prone but after completing press-ups, pt reported pain was 8/10 while walking around and he felt \"tightness. \"  Pt placed in prone position with head of table elevated. After lying for 5:00, pt again reported pain was decreased to 0/10. PT recommended that pt continue with BRANDY while at work and lie prone when on his breaks if able. Pt also instructed to begin lying in elevated prone position while at home for 5:00 every 2-3 hours; pt verbalized understanding. Revised Classification (if appropriate):    [] Derangement   [] Dysfunction   [] Posture           [] OTHER (subgroup)    Management Today:   [x] Posture      [x] HEP instruction     [x] Exercise  9/13/2021 - posture/use of lumbar roll, avoid flexion, THAIS vs BRANDY 3x10 every 2-3 hours (instruction handout administered)   9/17/2021 - press-ups 3x10 every 3 hours (BRANDY while at work)  10/11/2021 - elevated prone lying 5:00 every 2-3 hours when at home      Plan for next session:  Reassess pt's response to HEP and progress with extension principle as indicated.         Barriers to Recovery:  Chronic LBP; job duties (involves a lot of forward flexion)        CPT codes 10/11/2021 Units  Minutes   Low Complexity PT evaluation  37367     Moderate Complexity PT evaluation  49780     High Complexity PT evaluation [id number]     PT Re-evaluation  [id number]     Gait training [id number]     Manual therapy  [id number]     Therapeutic activities  [id number] 1    Therapeutic exercises  65262 2    Neuromuscular reeducation  40535 Time In:  1056  Time Out:  [address], PT, DPT   [id number]

## 2021-10-19 ENCOUNTER — HOSPITAL ENCOUNTER (OUTPATIENT)
Dept: PHYSICAL THERAPY | Age: 46
Setting detail: THERAPIES SERIES
Discharge: HOME | End: 2021-10-19
Payer: COMMERCIAL

## 2021-10-19 PROCEDURE — 97110 THERAPEUTIC EXERCISES: CPT

## 2021-10-19 PROCEDURE — 97530 THERAPEUTIC ACTIVITIES: CPT

## 2021-10-19 NOTE — PROGRESS NOTES
[address] [facility]                Phone: [phone]   Fax: [phone]    Physical Therapy Daily Treatment Note  Date:  10/19/2021    Patient Name:  Silvio Nicholas. :  1975  MRN: [id number]    Referring Physician:  Allison Nava MD  Insurance Information:  [facility]      Evaluation date:  2021  Diagnosis:  LBP  Cert Dates:  TBD  OTX-78 Codes:  M54.5  Evaluating Physical Therapist:  Yazan Peñaloza PT, DPT        Visit:       [facility] RE-ASSESSMENT FORM      Check of Management Strategies:     Compliance / Commitment  [] Excellent   [] Good   [x] Fair   [] Poor    Posture Correction: [x]Yes   [] No    Performing Exercises:  [x]Yes   [] No    Frequency: [] Appropriate [x] Not appropriate                        Symptom Response during  exercises:  [] Increase   [x] Decrease   [] No  effect     Technique: [x] Good  [] Needs correcting    Comments:  Pt noted to have poor posture while sitting and waiting for PT (pt sat on stool instead of chair with LB support). Pt stated BRANDY has been helping at work. Pt also stated he has been pain-free since .         Symptomatic Presentation:    Pain Location: [] Centralised     [x] Same    [] Peripheralized               Description: R LB     Frequency: []Better    [x]Same   []Worse    Severity: 0/10         Functional Status:  % improvement since initial assessment:  %    Exercises:     Exercise  During After  Comments   NT THAIS 2x10              BRANDY               Press-ups with exhale 4x10              Ambulation - PRN to assess LBP             NT Static prone positioning - elevated head of mat table x5:00                                           Lumbar spine AROM 10/19/2021:  Flexion, side bending R/L - WFL, no effect on pain  Extension - minimal movement loss, no effect on pain     Mechanical Presentation:    Sitting Posture: []Good   [x]Fair  []Poor                  Standing Posture:  []Good   [x]Fair []Poor      Deformity: [] Yes    [] No   [x] Not applicable                  Neurological Testing:  [] Better    [] Same   [] Worse    [x] NA     Movement Loss: [x] Better    [] Same   [] Worse      Repeated Movements:   [x] Better    [] Same   [] Worse          SUMMARY: [x] Better    [] Same   [] Worse                    Classification Confirmed   [x]  Yes     [] No    Comments:  Pt demonstrated good form and pacing with press-ups this morning. Pt reported no pain when finished. Pt educated on importance of continued compliance and consistency with HEP, including posture and avoiding flexion. Pt verbalized understanding. Revised Classification (if appropriate):    [x] Derangement   [] Dysfunction   [] Posture           [] OTHER (subgroup)    Management Today:   [x] Posture      [x] HEP instruction     [x] Exercise  9/13/2021 - posture/use of lumbar roll, avoid flexion, THAIS vs BRANDY 3x10 every 2-3 hours (instruction handout administered)   9/17/2021 - press-ups 3x10 every 3 hours (BRANDY while at work)  10/11/2021 - elevated prone lying 5:00 every 2-3 hours when at home      Plan for next session:  Reassess pt's response to HEP and progress with extension principle as indicated.         Barriers to Recovery:  Chronic LBP; job duties (involves a lot of forward flexion)        CPT codes 10/19/2021 Units  Minutes   Low Complexity PT evaluation  09002     Moderate Complexity PT evaluation  17372     High Complexity PT evaluation [id number]     PT Re-evaluation  [id number]     Gait training [id number]     Manual therapy  36114     Therapeutic activities  55627 1    Therapeutic exercises  42142 1    Neuromuscular reeducation  12676                                                                                                                                                                       Time In:  1000  Time Out:  [email or web address] Mark, PT, DPT   [id number]

## 2021-11-12 ENCOUNTER — HOSPITAL ENCOUNTER (OUTPATIENT)
Dept: PHYSICAL THERAPY | Age: 46
Setting detail: THERAPIES SERIES
Discharge: HOME | End: 2021-11-12
Payer: COMMERCIAL

## 2021-11-12 NOTE — DISCHARGE SUMMARY
. [facility] Debbie Aviles   Phone: [phone] Fax: [phone]      Outpatient Physical Therapy  Non compliance/Attendance Issue          Date:  2021    Patient Name:  Matthew Bella :  1975                     MRN: [id number]     Referring Di Mendez MD  Insurance Information:  [facility]      Evaluation date:  2021  Diagnosis:  LBP  Cert Dates:  TBD  TBT-53 Codes:  M54.5  Evaluating Physical Therapist:  Sobia Pan, PT, DPT     Total visits to date:  4  Cancels/No Shows to date:  2 no shows      Dear Dr. Perlita Drake,      This is to inform you that, as per Nuria Gonzalez, your patient Matthew Bella is, as of todays date, being discharged from Physical Therapy secondary to the following reason(s):    Pt was last seen in this clinic on 10/19/2021. Pt did not show for his next scheduled appointment on 10/26/2021 and has not contacted this office to reschedule PT sessions. If you have any questions, please feel free to call at [phone].       Thank you,    Rodolfo Magana, PT, DPT    [address], [place] [address]   [phone]

## 2024-04-02 NOTE — PROGRESS NOTES
daily as needed for Pain  Patient not taking: Reported on 9/30/2019 8/12/19 8/19/19  MARYBEL Rowe - JONATHON   oxyCODONE-acetaminophen (ROXICET) 5-325 MG/5ML solution Take by mouth daily as needed for Pain. Ordered every 6 hours but pt takes once daily . Historical Provider, MD       No Known Allergies    Family History   Problem Relation Age of Onset    No Known Problems Mother     No Known Problems Father     No Known Problems Brother     No Known Problems Sister        Social History     Tobacco Use    Smoking status: Current Every Day Smoker     Packs/day: 0.50     Years: 25.00     Pack years: 12.50     Types: Cigarettes    Smokeless tobacco: Never Used   Substance Use Topics    Alcohol use: No    Drug use: Yes     Types: Marijuana     Comment: once a week with last smoked 4 days ago         Review of Systems   General ROS: negative  Hematological and Lymphatic ROS: negative  Respiratory ROS: no cough, shortness of breath, or wheezing  Cardiovascular ROS: no chest pain or dyspnea on exertion  Gastrointestinal ROS: no abdominal pain, change in bowel habits, or black or bloody stools  Genito-Urinary ROS: positive for right groin pain  Musculoskeletal ROS: negative      PHYSICAL EXAM:    Vitals:    07/16/20 1346   BP: 119/69   Pulse: 93   Resp: 14   Temp: 98 °F (36.7 °C)   SpO2: 96%       General Appearance:  awake, alert, oriented, in no acute distress  Skin:  Skin color, texture, turgor normal. No rashes or lesions. Head/face:  NCAT  Eyes:  No gross abnormalities. Lungs:  Normal expansion. RA. Heart:  Heart regular rate and rhythm  Abdomen:  Soft, non-tender, ND. Port sites are well healed. Bilateral inguinal canals are palpated without evidence of hernia. Mild TTP with palpation of the external ring on the right. No masses. Extremities: Extremities warm to touch, pink, with no edema. Male Rectal:  Rectal exam refused by patient.       ASSESSMENT:  54 y.o. male with hx of right inguinal hernia repair (lap or robotic) performed about 3 years ago at [facility]. Has had pain at his right groin starting a few months after his operation which is most likely entrapment. No recurrence noted on examination today. Will plan for the following:    PLAN:  - CT pelvis with oral contrast for further evaluation  - recommend NSAID (mobiq) and gabapentin to help control pain  - restart flexeril TID ID  - will schedule f/u to discuss results of CT scan and additional options    Electronically signed by Sergio Smith MD on 7/16/20 at 2:24 PM EDT    Attending Physician Note  Chief Complaint   Patient presents with    Consultation     Inguinal Hernia consult, ext referral Zayda Liao, [address]    Inguinal Hernia     Right ingainal hernia, pt states he had previous surgery last year at [facility]     History:  The patient is 54 y.o. male. I discussed the case with the resident and I saw and examined the patient with the resident. I reviewed the resident's note and made corrections as appropriate. I reviewed the patient's Past Medical History, Past Surgical History, Medications, and Allergies. Physical Exam:  Vitals:    07/16/20 1346   BP: 119/69   Site: Right Upper Arm   Position: Sitting   Cuff Size: Medium Adult   Pulse: 93   Resp: 14   Temp: 98 °F (36.7 °C)   TempSrc: Infrared   SpO2: 96%   Weight: 150 lb (68 kg)   Height: 5' 10\" (1.778 m)       Body mass index is 21.52 kg/m². Chest: Breath sounds were clear and equal with no rales, wheezes, or rhonchi. Respiratory effort was normal with no retractions or use of accessory muscles. Cardiovascular: Heart sounds were normal with a regular rate and rhythm. There were no murmurs or gallops. Abdomen: Bowel sounds were normal.  The abdomen was soft and non distended. There was 4/10 left lower quadrant and 6/10 right lower quadrant tenderness, guarding, rebound, or rigidity. There was no masses, hepatosplenomegaly, or hernias. Laparoscopic surgical scars noted. Genitourinary: No inguinal hernias were noted on coughing and straining. Penis and testicles normal.  He had mild to moderate tenderness to palpation right pubic tubercle. Impression/Plan:  I reviewed the resident's impression and plan and made corrections as appropriate. 1. Chronic right groin pain - I do not find any evidence of recurrent right inguinal hernia on physical exam.  I will have patient sign medical release and [facility]  I recommended CT scan of the pelvis with IV and oral contrast to rule out a obturator hernia or other causes for his pain. Most likely this is a neuropathic pain related to his previous laparoscopic inguinal hernia. Recommend he apply heat therapy to the right groin for 30 to 45 minutes 4 times a day. I also ordered Mobic 7.5 mg p.o. daily, gabapentin 100 mg p.o. 3 times daily, and restart Flexeril 5 mg p.o. 3 times daily as needed right groin pain. Patient follow-up with me in the office 2 weeks after the CT scan. 2. Lumbar disc disease and spondylosis and facet arthropathy - patient has had nerve blocks in the past for this with some relief. However did not affect his right groin pain.     Electronically by Mendel Bears, MD  on 7/16/20 at 3:01 PM EDT No